# Patient Record
Sex: MALE | Race: WHITE | NOT HISPANIC OR LATINO | Employment: FULL TIME | URBAN - NONMETROPOLITAN AREA
[De-identification: names, ages, dates, MRNs, and addresses within clinical notes are randomized per-mention and may not be internally consistent; named-entity substitution may affect disease eponyms.]

---

## 2017-07-17 ENCOUNTER — HOSPITAL ENCOUNTER (OUTPATIENT)
Dept: CT IMAGING | Facility: HOSPITAL | Age: 62
Discharge: HOME | End: 2017-07-17
Admitting: NURSE PRACTITIONER

## 2017-07-17 ENCOUNTER — TRANSCRIBE ORDERS (OUTPATIENT)
Dept: ADMINISTRATIVE | Facility: HOSPITAL | Age: 62
End: 2017-07-17

## 2017-07-17 DIAGNOSIS — R10.9 ABDOMINAL PAIN, UNSPECIFIED LOCATION: ICD-10-CM

## 2017-07-17 DIAGNOSIS — R10.2 PELVIC PAIN: ICD-10-CM

## 2017-07-17 DIAGNOSIS — R63.4 WEIGHT LOSS: ICD-10-CM

## 2017-07-17 DIAGNOSIS — R11.0 NAUSEA: ICD-10-CM

## 2017-07-17 DIAGNOSIS — R10.9 ABDOMINAL PAIN, UNSPECIFIED LOCATION: Primary | ICD-10-CM

## 2017-07-17 LAB — CREAT BLDA-MCNC: 0.8 MG/DL (ref 0.6–1.3)

## 2017-07-17 PROCEDURE — 74177 CT ABD & PELVIS W/CONTRAST: CPT

## 2017-07-17 PROCEDURE — 82565 ASSAY OF CREATININE: CPT

## 2017-07-17 PROCEDURE — 0 IOPAMIDOL 61 % SOLUTION: Performed by: NURSE PRACTITIONER

## 2017-07-17 PROCEDURE — 74177 CT ABD & PELVIS W/CONTRAST: CPT | Performed by: RADIOLOGY

## 2017-07-17 RX ADMIN — IOPAMIDOL 100 ML: 612 INJECTION, SOLUTION INTRAVENOUS at 12:53

## 2018-05-25 ENCOUNTER — TRANSCRIBE ORDERS (OUTPATIENT)
Dept: ADMINISTRATIVE | Facility: HOSPITAL | Age: 63
End: 2018-05-25

## 2018-05-25 DIAGNOSIS — I10 HTN, GOAL BELOW 130/80: Primary | ICD-10-CM

## 2018-05-31 ENCOUNTER — HOSPITAL ENCOUNTER (OUTPATIENT)
Dept: ULTRASOUND IMAGING | Facility: HOSPITAL | Age: 63
Discharge: HOME | End: 2018-05-31
Admitting: NURSE PRACTITIONER

## 2018-05-31 DIAGNOSIS — I10 HTN, GOAL BELOW 130/80: ICD-10-CM

## 2018-05-31 PROCEDURE — 76775 US EXAM ABDO BACK WALL LIM: CPT | Performed by: RADIOLOGY

## 2018-05-31 PROCEDURE — 76775 US EXAM ABDO BACK WALL LIM: CPT

## 2019-06-03 ENCOUNTER — TRANSCRIBE ORDERS (OUTPATIENT)
Dept: ADMINISTRATIVE | Facility: HOSPITAL | Age: 64
End: 2019-06-03

## 2019-06-03 DIAGNOSIS — R63.4 ABNORMAL WEIGHT LOSS: ICD-10-CM

## 2019-06-03 DIAGNOSIS — K70.30 CIRRHOSIS, LAENNEC'S (HCC): Primary | ICD-10-CM

## 2019-06-13 ENCOUNTER — OFFICE VISIT (OUTPATIENT)
Dept: UROLOGY | Facility: CLINIC | Age: 64
End: 2019-06-13

## 2019-06-13 VITALS — BODY MASS INDEX: 24.38 KG/M2 | HEIGHT: 72 IN | WEIGHT: 180 LBS

## 2019-06-13 DIAGNOSIS — R35.0 FREQUENCY OF MICTURITION: Primary | ICD-10-CM

## 2019-06-13 DIAGNOSIS — R35.1 BENIGN PROSTATIC HYPERPLASIA WITH NOCTURIA: ICD-10-CM

## 2019-06-13 DIAGNOSIS — N40.1 BENIGN PROSTATIC HYPERPLASIA WITH NOCTURIA: ICD-10-CM

## 2019-06-13 DIAGNOSIS — R33.9 INCOMPLETE BLADDER EMPTYING: ICD-10-CM

## 2019-06-13 LAB
BILIRUB BLD-MCNC: NEGATIVE MG/DL
CLARITY, POC: CLEAR
COLOR UR: YELLOW
GLUCOSE UR STRIP-MCNC: NEGATIVE MG/DL
KETONES UR QL: NEGATIVE
LEUKOCYTE EST, POC: NEGATIVE
NITRITE UR-MCNC: NEGATIVE MG/ML
PH UR: 5 [PH] (ref 5–8)
PROT UR STRIP-MCNC: NEGATIVE MG/DL
RBC # UR STRIP: NEGATIVE /UL
SP GR UR: 1.03 (ref 1–1.03)
UROBILINOGEN UR QL: NORMAL

## 2019-06-13 PROCEDURE — 81003 URINALYSIS AUTO W/O SCOPE: CPT | Performed by: UROLOGY

## 2019-06-13 PROCEDURE — 99203 OFFICE O/P NEW LOW 30 MIN: CPT | Performed by: UROLOGY

## 2019-06-13 PROCEDURE — 51798 US URINE CAPACITY MEASURE: CPT | Performed by: UROLOGY

## 2019-06-13 RX ORDER — DUTASTERIDE AND TAMSULOSIN HYDROCHLORIDE CAPSULES .5; .4 MG/1; MG/1
1 CAPSULE ORAL NIGHTLY
COMMUNITY
Start: 2019-05-20 | End: 2022-04-14

## 2019-06-13 RX ORDER — LEVOTHYROXINE SODIUM 0.12 MG/1
125 TABLET ORAL DAILY
COMMUNITY
Start: 2019-05-20

## 2019-06-13 RX ORDER — CHOLECALCIFEROL (VITAMIN D3) 1250 MCG
1 CAPSULE ORAL
COMMUNITY
Start: 2019-02-26

## 2019-06-13 RX ORDER — AMLODIPINE BESYLATE AND BENAZEPRIL HYDROCHLORIDE 5; 20 MG/1; MG/1
1 CAPSULE ORAL NIGHTLY
COMMUNITY
Start: 2019-02-26

## 2019-06-13 RX ORDER — TAMSULOSIN HYDROCHLORIDE 0.4 MG/1
1 CAPSULE ORAL DAILY
COMMUNITY
Start: 2014-11-10 | End: 2022-04-14

## 2019-06-13 RX ORDER — LANSOPRAZOLE 30 MG/1
30 CAPSULE, DELAYED RELEASE ORAL DAILY
COMMUNITY
Start: 2014-11-10 | End: 2022-12-01 | Stop reason: ALTCHOICE

## 2019-06-13 RX ORDER — NADOLOL 20 MG/1
TABLET ORAL
Status: ON HOLD | COMMUNITY
Start: 2014-11-10 | End: 2019-07-24

## 2019-06-13 NOTE — PROGRESS NOTES
"Chief Complaint:          Chief Complaint   Patient presents with   • BPH w/ Urinary frequency       HPI:   64 y.o. male referred with significant BPH voiding symptomatology much worse over the last 1-1/2 years.  He is been on alpha blockade unsuccessfully and gets up every 30 minutes at night.  He is been on a 5 alpha reductase inhibitor for 2 weeks unsuccessful he gets a PSA every year that is \"good\".  He has had stones and stents by Dr. Lay.  He has no medical problems.  He was previously coal , he has   good erections with poor ejaculations.  His urine is negative with a negligible postvoid residual.  His exam shows a significant downward deflection of his penis from a chordee otherwise a normal examination.  I suspect were dealing with an unusual prostate anatomic variation with a median bar and I think he needs cystoscopy as he is a failure of both alpha blockade and 5 alpha reductase therapy      Past Medical History:        Past Medical History:   Diagnosis Date   • GERD (gastroesophageal reflux disease)    • Hiatal hernia    • Liver disease    • Thyroid disease          Current Meds:     Current Outpatient Medications   Medication Sig Dispense Refill   • amLODIPine-benazepril (LOTREL) 5-20 MG per capsule Take 1 capsule by mouth.     • Cholecalciferol (VITAMIN D3) 68680 units capsule Take 1 capsule by mouth.     • dutasteride-tamsulosin (CLARK) 0.5-0.4 MG capsule capsule Take 1 capsule by mouth.     • lansoprazole (PREVACID) 30 MG capsule Take 1 capsule by mouth.     • levothyroxine (SYNTHROID) 125 MCG tablet Take 1 tablet by mouth.     • nadolol (CORGARD) 20 MG tablet TAKE 1 TABLET BY MOUTH EVERY DAY     • tamsulosin (FLOMAX) 0.4 MG capsule 24 hr capsule Take  by mouth Daily.       No current facility-administered medications for this visit.         Allergies:      No Known Allergies     Past Surgical History:     Past Surgical History:   Procedure Laterality Date   • COLONOSCOPY     • ENDOSCOPY   "   • KIDNEY STONE SURGERY     • SEPTOPLASTY           Social History:     Social History     Socioeconomic History   • Marital status:      Spouse name: Not on file   • Number of children: Not on file   • Years of education: Not on file   • Highest education level: Not on file   Tobacco Use   • Smoking status: Former Smoker     Packs/day: 0.50     Years: 3.00     Pack years: 1.50     Types: Cigarettes     Last attempt to quit: 1979     Years since quittin.0   • Smokeless tobacco: Never Used   Substance and Sexual Activity   • Alcohol use: Yes     Alcohol/week: 0.6 oz     Types: 1 Cans of beer per week     Frequency: Never     Comment: 1/2 beer every now and then    • Drug use: No   • Sexual activity: Not Currently     Partners: Female       Family History:     Family History   Problem Relation Age of Onset   • Colon cancer Father    • Throat cancer Mother        Review of Systems:     Review of Systems   Constitutional: Negative.    HENT: Negative.    Eyes: Negative.    Respiratory: Negative.    Cardiovascular: Negative.    Gastrointestinal: Negative.    Endocrine: Negative.    Genitourinary: Positive for frequency and urgency.   Musculoskeletal: Negative.    Allergic/Immunologic: Negative.    Neurological: Negative.    Hematological: Negative.    Psychiatric/Behavioral: Negative.        Physical Exam:     Physical Exam   Constitutional: He is oriented to person, place, and time. He appears well-developed and well-nourished.   HENT:   Head: Normocephalic and atraumatic.   Eyes: Conjunctivae and EOM are normal. Pupils are equal, round, and reactive to light.   Neck: Normal range of motion.   Cardiovascular: Normal rate, regular rhythm, normal heart sounds and intact distal pulses.   Pulmonary/Chest: Effort normal and breath sounds normal.   Abdominal: Soft. Bowel sounds are normal.   Genitourinary:   Genitourinary Comments: Soft nontender abdomen with no organomegaly, rigidity, or tenderness.  He has  normal external genitalia and uncircumcised phallus with a freely movable foreskin bilaterally descended testes without masses there is no inguinal hernias adenopathy or abnormalities he had good rectal tone and a large smooth firm prostate.  There is no nodularity or any suspicious rectal abnormalities.  His penis shows a significant chordee with a downward deflection of his erection         Musculoskeletal: Normal range of motion.   Neurological: He is alert and oriented to person, place, and time. He has normal reflexes.   Skin: Skin is warm and dry.   Psychiatric: He has a normal mood and affect. His behavior is normal. Judgment and thought content normal.   Nursing note and vitals reviewed.      I have reviewed the following portions of the patient's history: allergies, current medications, past family history, past medical history, past social history, past surgical history, problem list and ROS and confirm it's accurate.      Procedure:       Assessment/Plan:   BPH: Discussed the pathophysiology of BPH and obstruction.  We discussed the static and dynamic effect effects of BPH as well as using 5 alpha reductase inhibitors versus alpha blockade.  We discussed the indications for transurethral surgery as well.  And/ or other therapeutic options available including all of the newer techniques.  He is on both Flomax and Proscar and not getting a great results probably related to the fact that he has a medium bar I am going to set him up for cystoscopy and evaluate him in this regard.      .      Patient's Body mass index is 24.41 kg/m². BMI is within normal parameters. No follow-up required..              This document has been electronically signed by GEOVANNY COYNE MD June 13, 2019 9:40 AM

## 2019-06-14 PROBLEM — N40.1 BENIGN PROSTATIC HYPERPLASIA WITH NOCTURIA: Status: ACTIVE | Noted: 2019-06-14

## 2019-06-14 PROBLEM — R35.1 BENIGN PROSTATIC HYPERPLASIA WITH NOCTURIA: Status: ACTIVE | Noted: 2019-06-14

## 2019-06-17 ENCOUNTER — HOSPITAL ENCOUNTER (OUTPATIENT)
Dept: CT IMAGING | Facility: HOSPITAL | Age: 64
Discharge: HOME | End: 2019-06-17
Admitting: INTERNAL MEDICINE

## 2019-06-17 DIAGNOSIS — K70.30 CIRRHOSIS, LAENNEC'S (HCC): ICD-10-CM

## 2019-06-17 DIAGNOSIS — R63.4 ABNORMAL WEIGHT LOSS: ICD-10-CM

## 2019-06-17 LAB — CREAT BLDA-MCNC: 0.7 MG/DL (ref 0.6–1.3)

## 2019-06-17 PROCEDURE — 74170 CT ABD WO CNTRST FLWD CNTRST: CPT | Performed by: RADIOLOGY

## 2019-06-17 PROCEDURE — 74170 CT ABD WO CNTRST FLWD CNTRST: CPT

## 2019-06-17 PROCEDURE — 82565 ASSAY OF CREATININE: CPT

## 2019-06-17 PROCEDURE — 0 IOVERSOL 68 % SOLUTION: Performed by: INTERNAL MEDICINE

## 2019-06-17 RX ADMIN — IOVERSOL 90 ML: 678 INJECTION INTRA-ARTERIAL; INTRAVENOUS at 08:51

## 2019-06-24 ENCOUNTER — PROCEDURE VISIT (OUTPATIENT)
Dept: UROLOGY | Facility: CLINIC | Age: 64
End: 2019-06-24

## 2019-06-24 VITALS — WEIGHT: 181 LBS | BODY MASS INDEX: 24.52 KG/M2 | HEIGHT: 72 IN

## 2019-06-24 DIAGNOSIS — R35.1 BENIGN PROSTATIC HYPERPLASIA WITH NOCTURIA: ICD-10-CM

## 2019-06-24 DIAGNOSIS — R33.8 URINARY RETENTION DUE TO BENIGN PROSTATIC HYPERPLASIA: ICD-10-CM

## 2019-06-24 DIAGNOSIS — N40.1 BENIGN PROSTATIC HYPERPLASIA WITH NOCTURIA: ICD-10-CM

## 2019-06-24 DIAGNOSIS — Z48.816 AFTERCARE FOLLOWING SURGERY OF THE GENITOURINARY SYSTEM: Primary | ICD-10-CM

## 2019-06-24 DIAGNOSIS — N40.1 URINARY RETENTION DUE TO BENIGN PROSTATIC HYPERPLASIA: ICD-10-CM

## 2019-06-24 PROCEDURE — 52000 CYSTOURETHROSCOPY: CPT | Performed by: UROLOGY

## 2019-06-24 PROCEDURE — 96372 THER/PROPH/DIAG INJ SC/IM: CPT | Performed by: UROLOGY

## 2019-06-24 RX ORDER — GENTAMICIN SULFATE 40 MG/ML
80 INJECTION, SOLUTION INTRAMUSCULAR; INTRAVENOUS ONCE
Status: COMPLETED | OUTPATIENT
Start: 2019-06-24 | End: 2019-06-24

## 2019-06-24 RX ORDER — GENTAMICIN SULFATE 80 MG/100ML
80 INJECTION, SOLUTION INTRAVENOUS ONCE
Status: CANCELLED | OUTPATIENT
Start: 2019-07-24 | End: 2019-06-24

## 2019-06-24 RX ADMIN — GENTAMICIN SULFATE 80 MG: 40 INJECTION, SOLUTION INTRAMUSCULAR; INTRAVENOUS at 13:12

## 2019-06-24 NOTE — PROGRESS NOTES
"Chief Complaint:          Chief Complaint   Patient presents with   • Benign Prostatic Hypertrophy     cystoscopy       HPI:   64 y.o. male referred with significant BPH voiding symptomatology much worse over the last 1-1/2 years.  He is been on alpha blockade unsuccessfully and gets up every 30 minutes at night.  He is been on a 5 alpha reductase inhibitor for 2 weeks unsuccessful he gets a PSA every year that is \"good\".  He has had stones and stents by Dr. Velasquez.  He has no medical problems.  He was previously coal , he has   good erections with poor ejaculations.  His urine is negative with a negligible postvoid residual.  His exam shows a significant downward deflection of his penis from a chordee otherwise a normal examination.  I suspect were dealing with an unusual prostate anatomic variation with a median bar and I think he needs cystoscopy as he is a failure of both alpha blockade and 5 alpha reductase therapy  He returns today for cystoscopy.  His catheter was removed.  He has significant obstruction with a medium bar as I expected.  I meant to recommend a TURP I discussed this with him at length.  He is failed alpha blockade this is his only real option to be voiding normally without a catheter      Past Medical History:        Past Medical History:   Diagnosis Date   • GERD (gastroesophageal reflux disease)    • Hiatal hernia    • Liver disease    • Thyroid disease          Current Meds:     Current Outpatient Medications   Medication Sig Dispense Refill   • amLODIPine-benazepril (LOTREL) 5-20 MG per capsule Take 1 capsule by mouth.     • Cholecalciferol (VITAMIN D3) 83920 units capsule Take 1 capsule by mouth.     • dutasteride-tamsulosin (CLARK) 0.5-0.4 MG capsule capsule Take 1 capsule by mouth.     • lansoprazole (PREVACID) 30 MG capsule Take 1 capsule by mouth.     • levothyroxine (SYNTHROID) 125 MCG tablet Take 1 tablet by mouth.     • nadolol (CORGARD) 20 MG tablet TAKE 1 TABLET BY MOUTH " EVERY DAY     • tamsulosin (FLOMAX) 0.4 MG capsule 24 hr capsule Take  by mouth Daily.       No current facility-administered medications for this visit.         Allergies:      No Known Allergies     Past Surgical History:     Past Surgical History:   Procedure Laterality Date   • COLONOSCOPY     • ENDOSCOPY     • KIDNEY STONE SURGERY     • SEPTOPLASTY           Social History:     Social History     Socioeconomic History   • Marital status:      Spouse name: Not on file   • Number of children: Not on file   • Years of education: Not on file   • Highest education level: Not on file   Tobacco Use   • Smoking status: Former Smoker     Packs/day: 0.50     Years: 3.00     Pack years: 1.50     Types: Cigarettes     Last attempt to quit: 1979     Years since quittin.0   • Smokeless tobacco: Never Used   Substance and Sexual Activity   • Alcohol use: Yes     Alcohol/week: 0.6 oz     Types: 1 Cans of beer per week     Frequency: Never     Comment: 1/2 beer every now and then    • Drug use: No   • Sexual activity: Not Currently     Partners: Female       Family History:     Family History   Problem Relation Age of Onset   • Colon cancer Father    • Throat cancer Mother        Review of Systems:     Review of Systems   Constitutional: Negative.    HENT: Negative.    Eyes: Negative.    Respiratory: Negative.    Cardiovascular: Negative.    Gastrointestinal: Negative.    Endocrine: Negative.    Genitourinary: Positive for difficulty urinating, frequency and urgency.   Musculoskeletal: Negative.    Allergic/Immunologic: Negative.    Neurological: Negative.    Hematological: Negative.    Psychiatric/Behavioral: Negative.        Physical Exam:     Physical Exam   Constitutional: He is oriented to person, place, and time. He appears well-developed and well-nourished.   HENT:   Head: Normocephalic and atraumatic.   Eyes: Conjunctivae and EOM are normal. Pupils are equal, round, and reactive to light.   Neck: Normal  range of motion.   Cardiovascular: Normal rate, regular rhythm, normal heart sounds and intact distal pulses.   Pulmonary/Chest: Effort normal and breath sounds normal.   Abdominal: Soft. Bowel sounds are normal.   Musculoskeletal: Normal range of motion.   Neurological: He is alert and oriented to person, place, and time. He has normal reflexes.   Skin: Skin is warm and dry.   Psychiatric: He has a normal mood and affect. His behavior is normal. Judgment and thought content normal.   Nursing note and vitals reviewed.      I have reviewed the following portions of the patient's history: allergies, current medications, past family history, past medical history, past social history, past surgical history, problem list and ROS and confirm it's accurate.      Procedure:   Cystoscopy:  Patient presents today for cystourethroscopy.  I went ahead and obtained an informed consent including the risk of anesthesia, bleeding, infection, etc.  After prep and drape in a sterile fashion in the low dorsal lithotomy position the urethra was gently anesthetized with 10 cc of 2% viscous Xylocaine jelly.  After an appropriate period of topical anesthesia I used the Olympus digital 14 [place] flexible cystoscope to examine the anterior urethra which was completely normal, the ureteral orifices were visualized and normal in position and configuration there were no stones, tumors or foreign bodies.  There was significant obstruction from a medium bar.  the patient was given 80 mg of gentamicin in an intramuscular fashion  as prophylaxis for the cystoscopy and released from the clinic.    Assessment/Plan:   Urinary retention secondary to BPH-he is failed maximal medical therapy and I am recommending a TURP with the attendant risks and benefits of incontinence, inability to urinate etc. I re-encouraged the Laurent catheter until the time of surgical intervention            Patient's Body mass index is 24.55 kg/m². BMI is within normal  parameters. No follow-up required..              This document has been electronically signed by GEOVANNY COYNE MD June 24, 2019 12:52 PM

## 2019-06-26 PROBLEM — N40.1 URINARY RETENTION DUE TO BENIGN PROSTATIC HYPERPLASIA: Status: ACTIVE | Noted: 2019-06-26

## 2019-06-26 PROBLEM — R33.8 URINARY RETENTION DUE TO BENIGN PROSTATIC HYPERPLASIA: Status: ACTIVE | Noted: 2019-06-26

## 2019-07-23 ENCOUNTER — APPOINTMENT (OUTPATIENT)
Dept: PREADMISSION TESTING | Facility: HOSPITAL | Age: 64
End: 2019-07-23

## 2019-07-23 LAB
ANION GAP SERPL CALCULATED.3IONS-SCNC: 12.4 MMOL/L (ref 5–15)
BUN BLD-MCNC: 17 MG/DL (ref 8–23)
BUN/CREAT SERPL: 20 (ref 7–25)
CALCIUM SPEC-SCNC: 9.4 MG/DL (ref 8.6–10.5)
CHLORIDE SERPL-SCNC: 103 MMOL/L (ref 98–107)
CO2 SERPL-SCNC: 24.6 MMOL/L (ref 22–29)
CREAT BLD-MCNC: 0.85 MG/DL (ref 0.76–1.27)
DEPRECATED RDW RBC AUTO: 45 FL (ref 37–54)
ERYTHROCYTE [DISTWIDTH] IN BLOOD BY AUTOMATED COUNT: 13.8 % (ref 12.3–15.4)
GFR SERPL CREATININE-BSD FRML MDRD: 91 ML/MIN/1.73
GLUCOSE BLD-MCNC: 127 MG/DL (ref 65–99)
HCT VFR BLD AUTO: 38 % (ref 37.5–51)
HGB BLD-MCNC: 12.8 G/DL (ref 13–17.7)
MCH RBC QN AUTO: 30.6 PG (ref 26.6–33)
MCHC RBC AUTO-ENTMCNC: 33.7 G/DL (ref 31.5–35.7)
MCV RBC AUTO: 90.9 FL (ref 79–97)
PLATELET # BLD AUTO: 128 10*3/MM3 (ref 140–450)
PMV BLD AUTO: 11.3 FL (ref 6–12)
POTASSIUM BLD-SCNC: 3.8 MMOL/L (ref 3.5–5.2)
RBC # BLD AUTO: 4.18 10*6/MM3 (ref 4.14–5.8)
SODIUM BLD-SCNC: 140 MMOL/L (ref 136–145)
WBC NRBC COR # BLD: 4.94 10*3/MM3 (ref 3.4–10.8)

## 2019-07-23 PROCEDURE — 93005 ELECTROCARDIOGRAM TRACING: CPT

## 2019-07-23 PROCEDURE — 80048 BASIC METABOLIC PNL TOTAL CA: CPT | Performed by: UROLOGY

## 2019-07-23 PROCEDURE — 93010 ELECTROCARDIOGRAM REPORT: CPT | Performed by: INTERNAL MEDICINE

## 2019-07-23 PROCEDURE — 85027 COMPLETE CBC AUTOMATED: CPT | Performed by: UROLOGY

## 2019-07-23 PROCEDURE — 36415 COLL VENOUS BLD VENIPUNCTURE: CPT

## 2019-07-23 NOTE — DISCHARGE INSTRUCTIONS
TAKE the following medications the morning of surgery:  All heart or blood pressure medications    Please discontinue all blood thinners and anticoagulants (except aspirin) prior to surgery as per your surgeon and cardiologist instructions.  Aspirin may be continued up to the day prior to surgery.    HOLD all diabetic medications the morning of surgery as order by physician.    Arrival time for surgery on 7/24/2019 will be called to you by Dr. Campbell's office.    General Instructions:  • Do NOT eat or drink after midnight 7/23/2019 which includes water, mints, or gum.  • You may brush your teeth. Dental appliances that are removable must be taken out day of surgery.  • Do NOT smoke, chew tobacco, or drink alcohol within 24 hours prior to surgery.  • Bring medications in original bottles, any inhalers and if applicable your C-PAP/BI-PAP machine  • Bring any papers given to you in the doctor’s office  • Wear clean, comfortable clothes and socks  • Do NOT wear contact lenses or make-up or dark nail polish.  Bring a case for your glasses if applicable.  • Bring crutches or walker if applicable  • Leave all other valuables and jewelry at home  • If you were given a blood bank armband, continue to wear it until discharged.    Preventing a Surgical Site Infection:  • Shower the night before surgery (unless instructed otherwise) using a fresh bar of anti-bacterial soap (such as Dial) and clean washcloth.  Dry with a clean towel and dress in clean clothing.  • For 2 to 3 days before surgery, avoid shaving with a razor near where you will have surgery because the razor can irritate skin and make it easier to develop an infection.  Ask your surgeon if you will be receiving antibiotics prior to surgery.  • Make sure you, your family, and all healthcare providers clean their hands with soap and water or an alcohol-based hand  before caring for you or your wound.  • If at all possible, quit smoking as many days before  surgery as you can.    Day of Surgery:  Upon arrival, a pre-op nurse and anesthesiologist will review your health history, obtain vital signs, and answer questions you may have.  The only belongings needed at this time will be your home medications and if applicable you C-PAP/BI-PAP machine.  If you are staying overnight, your family can leave the rest of your belongings in the car and bring them to your room later.  A pre-op nurse will start an IV and you may receive medication in preparation for surgery.  Due to patient privacy and limited space, only one member of your family will be able to accompany you in the pre-op area.  While you are in surgery your family should notify the waiting room  if they leave the waiting room area and provide a contact number.  Please be aware that surgery does come with discomfort.  We want to make every effort to control your discomfort so please discuss any uncontrolled symptoms with your nurse.  Your doctor will most likely have prescribed pain medications.  If you are going home after surgery you will receive individualized written care instructions before being discharged.  A responsible adult must drive you to and from the hospital on the day of surgery and stay with you for 24 hours.  If you are staying overnight following surgery, you will be transported to your hospital room following the recovery period.

## 2019-07-24 ENCOUNTER — APPOINTMENT (OUTPATIENT)
Dept: GENERAL RADIOLOGY | Facility: HOSPITAL | Age: 64
End: 2019-07-24

## 2019-07-24 ENCOUNTER — HOSPITAL ENCOUNTER (OUTPATIENT)
Facility: HOSPITAL | Age: 64
Discharge: HOME | End: 2019-07-25
Attending: UROLOGY | Admitting: UROLOGY

## 2019-07-24 ENCOUNTER — ANESTHESIA EVENT (OUTPATIENT)
Dept: PERIOP | Facility: HOSPITAL | Age: 64
End: 2019-07-24

## 2019-07-24 ENCOUNTER — ANESTHESIA (OUTPATIENT)
Dept: PERIOP | Facility: HOSPITAL | Age: 64
End: 2019-07-24

## 2019-07-24 DIAGNOSIS — R35.1 BENIGN PROSTATIC HYPERPLASIA WITH NOCTURIA: ICD-10-CM

## 2019-07-24 DIAGNOSIS — N40.1 BENIGN PROSTATIC HYPERPLASIA WITH NOCTURIA: ICD-10-CM

## 2019-07-24 PROBLEM — Z48.816 AFTERCARE FOLLOWING SURGERY OF THE GENITOURINARY SYSTEM: Status: ACTIVE | Noted: 2019-07-24

## 2019-07-24 PROCEDURE — 25810000003 SODIUM CHLORIDE 0.9 % WITH KCL 20 MEQ 20-0.9 MEQ/L-% SOLUTION: Performed by: UROLOGY

## 2019-07-24 PROCEDURE — 25010000002 ONDANSETRON PER 1 MG: Performed by: NURSE ANESTHETIST, CERTIFIED REGISTERED

## 2019-07-24 PROCEDURE — 52601 PROSTATECTOMY (TURP): CPT | Performed by: UROLOGY

## 2019-07-24 PROCEDURE — 25010000002 PROPOFOL 10 MG/ML EMULSION: Performed by: NURSE ANESTHETIST, CERTIFIED REGISTERED

## 2019-07-24 PROCEDURE — 25010000002 PROMETHAZINE PER 50 MG: Performed by: UROLOGY

## 2019-07-24 PROCEDURE — 25010000002 FENTANYL CITRATE (PF) 100 MCG/2ML SOLUTION: Performed by: NURSE ANESTHETIST, CERTIFIED REGISTERED

## 2019-07-24 PROCEDURE — 25010000002 GENTAMICIN PER 80 MG: Performed by: UROLOGY

## 2019-07-24 PROCEDURE — 94799 UNLISTED PULMONARY SVC/PX: CPT

## 2019-07-24 PROCEDURE — 25010000002 MIDAZOLAM PER 1 MG: Performed by: NURSE ANESTHETIST, CERTIFIED REGISTERED

## 2019-07-24 PROCEDURE — 25010000002 HYDROMORPHONE PER 4 MG: Performed by: NURSE ANESTHETIST, CERTIFIED REGISTERED

## 2019-07-24 RX ORDER — MAGNESIUM HYDROXIDE 1200 MG/15ML
LIQUID ORAL AS NEEDED
Status: DISCONTINUED | OUTPATIENT
Start: 2019-07-24 | End: 2019-07-24 | Stop reason: HOSPADM

## 2019-07-24 RX ORDER — SODIUM CHLORIDE 0.9 % (FLUSH) 0.9 %
3 SYRINGE (ML) INJECTION EVERY 12 HOURS SCHEDULED
Status: DISCONTINUED | OUTPATIENT
Start: 2019-07-24 | End: 2019-07-24 | Stop reason: HOSPADM

## 2019-07-24 RX ORDER — FINASTERIDE 5 MG/1
5 TABLET, FILM COATED ORAL NIGHTLY
Status: DISCONTINUED | OUTPATIENT
Start: 2019-07-24 | End: 2019-07-25

## 2019-07-24 RX ORDER — GENTAMICIN SULFATE 80 MG/100ML
80 INJECTION, SOLUTION INTRAVENOUS EVERY 8 HOURS
Status: DISCONTINUED | OUTPATIENT
Start: 2019-07-24 | End: 2019-07-25

## 2019-07-24 RX ORDER — ATROPA BELLADONNA AND OPIUM 16.2; 3 MG/1; MG/1
30 SUPPOSITORY RECTAL DAILY PRN
Status: DISCONTINUED | OUTPATIENT
Start: 2019-07-24 | End: 2019-07-25 | Stop reason: HOSPADM

## 2019-07-24 RX ORDER — LIDOCAINE HYDROCHLORIDE 20 MG/ML
INJECTION, SOLUTION INFILTRATION; PERINEURAL AS NEEDED
Status: DISCONTINUED | OUTPATIENT
Start: 2019-07-24 | End: 2019-07-24 | Stop reason: SURG

## 2019-07-24 RX ORDER — SODIUM CHLORIDE, SODIUM LACTATE, POTASSIUM CHLORIDE, CALCIUM CHLORIDE 600; 310; 30; 20 MG/100ML; MG/100ML; MG/100ML; MG/100ML
125 INJECTION, SOLUTION INTRAVENOUS CONTINUOUS
Status: DISCONTINUED | OUTPATIENT
Start: 2019-07-24 | End: 2019-07-25 | Stop reason: HOSPADM

## 2019-07-24 RX ORDER — NADOLOL 40 MG/1
20 TABLET ORAL
Status: CANCELLED | OUTPATIENT
Start: 2019-07-24

## 2019-07-24 RX ORDER — TAMSULOSIN HYDROCHLORIDE 0.4 MG/1
0.4 CAPSULE ORAL NIGHTLY
Status: DISCONTINUED | OUTPATIENT
Start: 2019-07-24 | End: 2019-07-25

## 2019-07-24 RX ORDER — SODIUM CHLORIDE AND POTASSIUM CHLORIDE 150; 900 MG/100ML; MG/100ML
100 INJECTION, SOLUTION INTRAVENOUS CONTINUOUS
Status: DISCONTINUED | OUTPATIENT
Start: 2019-07-24 | End: 2019-07-25 | Stop reason: HOSPADM

## 2019-07-24 RX ORDER — NALOXONE HCL 0.4 MG/ML
0.4 VIAL (ML) INJECTION
Status: DISCONTINUED | OUTPATIENT
Start: 2019-07-24 | End: 2019-07-25 | Stop reason: HOSPADM

## 2019-07-24 RX ORDER — MAGNESIUM HYDROXIDE 1200 MG/15ML
3000 LIQUID ORAL
Status: DISCONTINUED | OUTPATIENT
Start: 2019-07-24 | End: 2019-07-25

## 2019-07-24 RX ORDER — PROPOFOL 10 MG/ML
VIAL (ML) INTRAVENOUS AS NEEDED
Status: DISCONTINUED | OUTPATIENT
Start: 2019-07-24 | End: 2019-07-24 | Stop reason: SURG

## 2019-07-24 RX ORDER — SODIUM CHLORIDE 0.9 % (FLUSH) 0.9 %
3-10 SYRINGE (ML) INJECTION AS NEEDED
Status: DISCONTINUED | OUTPATIENT
Start: 2019-07-24 | End: 2019-07-24 | Stop reason: HOSPADM

## 2019-07-24 RX ORDER — LEVOTHYROXINE SODIUM 0.12 MG/1
125 TABLET ORAL DAILY
Status: CANCELLED | OUTPATIENT
Start: 2019-07-25

## 2019-07-24 RX ORDER — ONDANSETRON 2 MG/ML
4 INJECTION INTRAMUSCULAR; INTRAVENOUS AS NEEDED
Status: DISCONTINUED | OUTPATIENT
Start: 2019-07-24 | End: 2019-07-24 | Stop reason: HOSPADM

## 2019-07-24 RX ORDER — OXYCODONE AND ACETAMINOPHEN 10; 325 MG/1; MG/1
1 TABLET ORAL EVERY 4 HOURS PRN
Status: DISCONTINUED | OUTPATIENT
Start: 2019-07-24 | End: 2019-07-25 | Stop reason: HOSPADM

## 2019-07-24 RX ORDER — PROMETHAZINE HYDROCHLORIDE 25 MG/ML
12.5 INJECTION, SOLUTION INTRAMUSCULAR; INTRAVENOUS EVERY 6 HOURS PRN
Status: DISCONTINUED | OUTPATIENT
Start: 2019-07-24 | End: 2019-07-25 | Stop reason: HOSPADM

## 2019-07-24 RX ORDER — MAGNESIUM HYDROXIDE 1200 MG/15ML
LIQUID ORAL
Status: COMPLETED
Start: 2019-07-24 | End: 2019-07-24

## 2019-07-24 RX ORDER — LEVOTHYROXINE SODIUM 0.12 MG/1
125 TABLET ORAL EVERY MORNING
Status: DISCONTINUED | OUTPATIENT
Start: 2019-07-25 | End: 2019-07-25 | Stop reason: HOSPADM

## 2019-07-24 RX ORDER — MAGNESIUM HYDROXIDE 1200 MG/15ML
LIQUID ORAL
Status: DISCONTINUED
Start: 2019-07-24 | End: 2019-07-24 | Stop reason: WASHOUT

## 2019-07-24 RX ORDER — NADOLOL 40 MG/1
20 TABLET ORAL
Status: DISCONTINUED | OUTPATIENT
Start: 2019-07-24 | End: 2019-07-25 | Stop reason: HOSPADM

## 2019-07-24 RX ORDER — GENTAMICIN SULFATE 80 MG/100ML
80 INJECTION, SOLUTION INTRAVENOUS ONCE
Status: COMPLETED | OUTPATIENT
Start: 2019-07-24 | End: 2019-07-24

## 2019-07-24 RX ORDER — IPRATROPIUM BROMIDE AND ALBUTEROL SULFATE 2.5; .5 MG/3ML; MG/3ML
3 SOLUTION RESPIRATORY (INHALATION) ONCE AS NEEDED
Status: DISCONTINUED | OUTPATIENT
Start: 2019-07-24 | End: 2019-07-24 | Stop reason: HOSPADM

## 2019-07-24 RX ORDER — ONDANSETRON 2 MG/ML
INJECTION INTRAMUSCULAR; INTRAVENOUS AS NEEDED
Status: DISCONTINUED | OUTPATIENT
Start: 2019-07-24 | End: 2019-07-24 | Stop reason: SURG

## 2019-07-24 RX ORDER — NADOLOL 20 MG/1
20 TABLET ORAL NIGHTLY
COMMUNITY

## 2019-07-24 RX ORDER — PANTOPRAZOLE SODIUM 40 MG/1
40 TABLET, DELAYED RELEASE ORAL EVERY MORNING
Status: CANCELLED | OUTPATIENT
Start: 2019-07-25

## 2019-07-24 RX ORDER — FENTANYL CITRATE 50 UG/ML
INJECTION, SOLUTION INTRAMUSCULAR; INTRAVENOUS AS NEEDED
Status: DISCONTINUED | OUTPATIENT
Start: 2019-07-24 | End: 2019-07-24 | Stop reason: SURG

## 2019-07-24 RX ORDER — FAMOTIDINE 10 MG/ML
INJECTION, SOLUTION INTRAVENOUS AS NEEDED
Status: DISCONTINUED | OUTPATIENT
Start: 2019-07-24 | End: 2019-07-24 | Stop reason: SURG

## 2019-07-24 RX ORDER — PANTOPRAZOLE SODIUM 40 MG/1
40 TABLET, DELAYED RELEASE ORAL EVERY MORNING
Status: DISCONTINUED | OUTPATIENT
Start: 2019-07-25 | End: 2019-07-25 | Stop reason: HOSPADM

## 2019-07-24 RX ORDER — TAMSULOSIN HYDROCHLORIDE 0.4 MG/1
0.4 CAPSULE ORAL DAILY
Status: CANCELLED | OUTPATIENT
Start: 2019-07-25

## 2019-07-24 RX ORDER — MEPERIDINE HYDROCHLORIDE 25 MG/ML
12.5 INJECTION INTRAMUSCULAR; INTRAVENOUS; SUBCUTANEOUS
Status: DISCONTINUED | OUTPATIENT
Start: 2019-07-24 | End: 2019-07-24 | Stop reason: HOSPADM

## 2019-07-24 RX ORDER — GLYCOPYRROLATE 0.2 MG/ML
INJECTION INTRAMUSCULAR; INTRAVENOUS AS NEEDED
Status: DISCONTINUED | OUTPATIENT
Start: 2019-07-24 | End: 2019-07-24 | Stop reason: SURG

## 2019-07-24 RX ORDER — FENTANYL CITRATE 50 UG/ML
50 INJECTION, SOLUTION INTRAMUSCULAR; INTRAVENOUS
Status: DISCONTINUED | OUTPATIENT
Start: 2019-07-24 | End: 2019-07-24 | Stop reason: HOSPADM

## 2019-07-24 RX ORDER — ATROPA BELLADONNA AND OPIUM 16.2; 6 MG/1; MG/1
SUPPOSITORY RECTAL AS NEEDED
Status: DISCONTINUED | OUTPATIENT
Start: 2019-07-24 | End: 2019-07-24 | Stop reason: HOSPADM

## 2019-07-24 RX ORDER — MIDAZOLAM HYDROCHLORIDE 1 MG/ML
INJECTION INTRAMUSCULAR; INTRAVENOUS AS NEEDED
Status: DISCONTINUED | OUTPATIENT
Start: 2019-07-24 | End: 2019-07-24 | Stop reason: SURG

## 2019-07-24 RX ORDER — HYDROMORPHONE HCL 110MG/55ML
PATIENT CONTROLLED ANALGESIA SYRINGE INTRAVENOUS AS NEEDED
Status: DISCONTINUED | OUTPATIENT
Start: 2019-07-24 | End: 2019-07-24 | Stop reason: SURG

## 2019-07-24 RX ORDER — OXYCODONE HYDROCHLORIDE AND ACETAMINOPHEN 5; 325 MG/1; MG/1
1 TABLET ORAL ONCE AS NEEDED
Status: DISCONTINUED | OUTPATIENT
Start: 2019-07-24 | End: 2019-07-24 | Stop reason: HOSPADM

## 2019-07-24 RX ADMIN — POTASSIUM CHLORIDE AND SODIUM CHLORIDE 100 ML/HR: 900; 150 INJECTION, SOLUTION INTRAVENOUS at 14:28

## 2019-07-24 RX ADMIN — EPHEDRINE SULFATE 10 MG: 50 INJECTION INTRAMUSCULAR; INTRAVENOUS; SUBCUTANEOUS at 10:07

## 2019-07-24 RX ADMIN — FENTANYL CITRATE 50 MCG: 50 INJECTION INTRAMUSCULAR; INTRAVENOUS at 09:01

## 2019-07-24 RX ADMIN — FENTANYL CITRATE 50 MCG: 50 INJECTION INTRAMUSCULAR; INTRAVENOUS at 09:09

## 2019-07-24 RX ADMIN — FENTANYL CITRATE 50 MCG: 50 INJECTION INTRAMUSCULAR; INTRAVENOUS at 08:50

## 2019-07-24 RX ADMIN — SODIUM CHLORIDE 3000 ML: 900 IRRIGANT IRRIGATION at 13:37

## 2019-07-24 RX ADMIN — SODIUM CHLORIDE 3000 ML: 900 IRRIGANT IRRIGATION at 18:40

## 2019-07-24 RX ADMIN — LIDOCAINE HYDROCHLORIDE 60 MG: 20 INJECTION, SOLUTION INFILTRATION; PERINEURAL at 08:50

## 2019-07-24 RX ADMIN — SODIUM CHLORIDE 3000 ML: 900 IRRIGANT IRRIGATION at 19:03

## 2019-07-24 RX ADMIN — SODIUM CHLORIDE, POTASSIUM CHLORIDE, SODIUM LACTATE AND CALCIUM CHLORIDE 125 ML/HR: 600; 310; 30; 20 INJECTION, SOLUTION INTRAVENOUS at 08:25

## 2019-07-24 RX ADMIN — MIDAZOLAM HYDROCHLORIDE 2 MG: 1 INJECTION, SOLUTION INTRAMUSCULAR; INTRAVENOUS at 08:47

## 2019-07-24 RX ADMIN — SODIUM CHLORIDE 3000 ML: 900 IRRIGANT IRRIGATION at 15:31

## 2019-07-24 RX ADMIN — ONDANSETRON 4 MG: 2 INJECTION, SOLUTION INTRAMUSCULAR; INTRAVENOUS at 10:18

## 2019-07-24 RX ADMIN — PROMETHAZINE HYDROCHLORIDE 12.5 MG: 25 INJECTION INTRAMUSCULAR; INTRAVENOUS at 14:02

## 2019-07-24 RX ADMIN — SODIUM CHLORIDE 3000 ML: 900 IRRIGANT IRRIGATION at 19:02

## 2019-07-24 RX ADMIN — SODIUM CHLORIDE 1000 ML: 900 IRRIGANT IRRIGATION at 14:59

## 2019-07-24 RX ADMIN — SODIUM CHLORIDE 3000 ML: 900 IRRIGANT IRRIGATION at 17:47

## 2019-07-24 RX ADMIN — PROPOFOL 150 MG: 10 INJECTION, EMULSION INTRAVENOUS at 08:50

## 2019-07-24 RX ADMIN — EPHEDRINE SULFATE 10 MG: 50 INJECTION INTRAMUSCULAR; INTRAVENOUS; SUBCUTANEOUS at 08:55

## 2019-07-24 RX ADMIN — SODIUM CHLORIDE, POTASSIUM CHLORIDE, SODIUM LACTATE AND CALCIUM CHLORIDE: 600; 310; 30; 20 INJECTION, SOLUTION INTRAVENOUS at 10:06

## 2019-07-24 RX ADMIN — SODIUM CHLORIDE 3000 ML: 900 IRRIGANT IRRIGATION at 13:53

## 2019-07-24 RX ADMIN — SODIUM CHLORIDE 3000 ML: 900 IRRIGANT IRRIGATION at 14:28

## 2019-07-24 RX ADMIN — ONDANSETRON 4 MG: 2 INJECTION, SOLUTION INTRAMUSCULAR; INTRAVENOUS at 11:12

## 2019-07-24 RX ADMIN — ATROPA BELLADONNA AND OPIUM 30 MG: 16.2; 3 SUPPOSITORY RECTAL at 18:46

## 2019-07-24 RX ADMIN — TAMSULOSIN HYDROCHLORIDE 0.4 MG: 0.4 CAPSULE ORAL at 20:54

## 2019-07-24 RX ADMIN — SODIUM CHLORIDE 3000 ML: 900 IRRIGANT IRRIGATION at 20:30

## 2019-07-24 RX ADMIN — GENTAMICIN SULFATE 80 MG: 80 INJECTION, SOLUTION INTRAVENOUS at 08:47

## 2019-07-24 RX ADMIN — FENTANYL CITRATE 50 MCG: 50 INJECTION INTRAMUSCULAR; INTRAVENOUS at 09:20

## 2019-07-24 RX ADMIN — SODIUM CHLORIDE 3000 ML: 900 IRRIGANT IRRIGATION at 16:25

## 2019-07-24 RX ADMIN — SODIUM CHLORIDE 3000 ML: 900 IRRIGANT IRRIGATION at 17:18

## 2019-07-24 RX ADMIN — GLYCOPYRROLATE 0.2 MG: 0.2 INJECTION, SOLUTION INTRAMUSCULAR; INTRAVENOUS at 09:01

## 2019-07-24 RX ADMIN — FAMOTIDINE 20 MG: 10 INJECTION, SOLUTION INTRAVENOUS at 08:47

## 2019-07-24 RX ADMIN — SODIUM CHLORIDE 3000 ML: 900 IRRIGANT IRRIGATION at 16:35

## 2019-07-24 RX ADMIN — GENTAMICIN SULFATE 80 MG: 80 INJECTION, SOLUTION INTRAVENOUS at 17:17

## 2019-07-24 RX ADMIN — SODIUM CHLORIDE 3000 ML: 900 IRRIGANT IRRIGATION at 23:31

## 2019-07-24 RX ADMIN — SODIUM CHLORIDE 3000 ML: 900 IRRIGANT IRRIGATION at 15:00

## 2019-07-24 RX ADMIN — EPHEDRINE SULFATE 10 MG: 50 INJECTION INTRAMUSCULAR; INTRAVENOUS; SUBCUTANEOUS at 08:58

## 2019-07-24 RX ADMIN — HYDROMORPHONE HYDROCHLORIDE 0.5 MG: 2 INJECTION, SOLUTION INTRAMUSCULAR; INTRAVENOUS; SUBCUTANEOUS at 10:27

## 2019-07-24 RX ADMIN — HYDROMORPHONE HYDROCHLORIDE 0.5 MG: 2 INJECTION, SOLUTION INTRAMUSCULAR; INTRAVENOUS; SUBCUTANEOUS at 09:34

## 2019-07-24 RX ADMIN — SODIUM CHLORIDE 3000 ML: 900 IRRIGANT IRRIGATION at 20:31

## 2019-07-24 RX ADMIN — FINASTERIDE 5 MG: 5 TABLET, FILM COATED ORAL at 20:54

## 2019-07-24 RX ADMIN — SODIUM CHLORIDE 3000 ML: 900 IRRIGANT IRRIGATION at 23:30

## 2019-07-24 NOTE — ANESTHESIA POSTPROCEDURE EVALUATION
Patient: Jose Maier    Procedure Summary     Date:  07/24/19 Room / Location:   [facility] /  [facility]    Anesthesia Start:  0847 Anesthesia Stop:  1030    Procedure:  Cystoscopy and transurethral resection of the prostate, removal of bladder stone (N/A ) Diagnosis:       Benign prostatic hyperplasia with nocturia      (Benign prostatic hyperplasia with nocturia [N40.1, R35.1])    Surgeon:  Deuce Campbell MD Provider:  Prasad Moya MD    Anesthesia Type:  general ASA Status:  3          Anesthesia Type: general  Last vitals  BP   123/91 (07/24/19 1109)   Temp   97.5 °F (36.4 °C) (07/24/19 1109)   Pulse   68 (07/24/19 1109)   Resp   16 (07/24/19 1109)     SpO2   99 % (07/24/19 1109)     Post Anesthesia Care and Evaluation    Patient location during evaluation: PHASE II  Patient participation: complete - patient participated  Level of consciousness: awake and alert  Pain score: 0  Pain management: adequate  Airway patency: patent  Anesthetic complications: No anesthetic complications    Cardiovascular status: acceptable  Respiratory status: acceptable  Hydration status: acceptable

## 2019-07-24 NOTE — ANESTHESIA PREPROCEDURE EVALUATION
Anesthesia Evaluation                  Airway   Mallampati: II  TM distance: >3 FB  Neck ROM: full  no difficulty expected  Dental    (+) poor dentition    Pulmonary    (+) decreased breath sounds,   Cardiovascular     Rhythm: regular  Rate: normal    (+) hypertension,       Neuro/Psych  GI/Hepatic/Renal/Endo    (+)  hiatal hernia, GERD,  liver disease fatty liver disease,     Musculoskeletal     Abdominal    Substance History      OB/GYN          Other                        Anesthesia Plan    ASA 3     general     intravenous induction   Anesthetic plan, all risks, benefits, and alternatives have been provided, discussed and informed consent has been obtained with: patient.  Use of blood products discussed with patient .   Plan discussed with CRNA.

## 2019-07-25 VITALS
OXYGEN SATURATION: 97 % | SYSTOLIC BLOOD PRESSURE: 122 MMHG | TEMPERATURE: 98.5 F | BODY MASS INDEX: 23.74 KG/M2 | RESPIRATION RATE: 18 BRPM | DIASTOLIC BLOOD PRESSURE: 77 MMHG | WEIGHT: 175.3 LBS | HEART RATE: 55 BPM | HEIGHT: 72 IN

## 2019-07-25 LAB
LAB AP CASE REPORT: NORMAL
PATH REPORT.FINAL DX SPEC: NORMAL

## 2019-07-25 PROCEDURE — 25010000002 PROMETHAZINE PER 50 MG: Performed by: UROLOGY

## 2019-07-25 PROCEDURE — 99024 POSTOP FOLLOW-UP VISIT: CPT | Performed by: UROLOGY

## 2019-07-25 PROCEDURE — 25010000002 GENTAMICIN PER 80 MG: Performed by: UROLOGY

## 2019-07-25 PROCEDURE — 25810000003 SODIUM CHLORIDE 0.9 % WITH KCL 20 MEQ 20-0.9 MEQ/L-% SOLUTION: Performed by: UROLOGY

## 2019-07-25 RX ADMIN — SODIUM CHLORIDE 3000 ML: 900 IRRIGANT IRRIGATION at 03:14

## 2019-07-25 RX ADMIN — POTASSIUM CHLORIDE AND SODIUM CHLORIDE 100 ML/HR: 900; 150 INJECTION, SOLUTION INTRAVENOUS at 02:23

## 2019-07-25 RX ADMIN — SODIUM CHLORIDE 3000 ML: 900 IRRIGANT IRRIGATION at 03:15

## 2019-07-25 RX ADMIN — PANTOPRAZOLE SODIUM 40 MG: 40 TABLET, DELAYED RELEASE ORAL at 06:23

## 2019-07-25 RX ADMIN — PROMETHAZINE HYDROCHLORIDE 12.5 MG: 25 INJECTION INTRAMUSCULAR; INTRAVENOUS at 10:20

## 2019-07-25 RX ADMIN — GENTAMICIN SULFATE 80 MG: 80 INJECTION, SOLUTION INTRAVENOUS at 00:19

## 2019-07-25 RX ADMIN — SODIUM CHLORIDE 3000 ML: 900 IRRIGANT IRRIGATION at 06:33

## 2019-07-25 RX ADMIN — SODIUM CHLORIDE 3000 ML: 900 IRRIGANT IRRIGATION at 06:34

## 2019-07-25 RX ADMIN — LEVOTHYROXINE SODIUM 125 MCG: 125 TABLET ORAL at 06:22

## 2019-07-26 ENCOUNTER — OFFICE VISIT (OUTPATIENT)
Dept: UROLOGY | Facility: CLINIC | Age: 64
End: 2019-07-26

## 2019-07-26 VITALS
HEIGHT: 72 IN | SYSTOLIC BLOOD PRESSURE: 118 MMHG | TEMPERATURE: 98.6 F | BODY MASS INDEX: 23.84 KG/M2 | DIASTOLIC BLOOD PRESSURE: 68 MMHG | WEIGHT: 176 LBS

## 2019-07-26 DIAGNOSIS — N40.1 URINARY RETENTION DUE TO BENIGN PROSTATIC HYPERPLASIA: ICD-10-CM

## 2019-07-26 DIAGNOSIS — N40.0 BENIGN PROSTATIC HYPERPLASIA, UNSPECIFIED WHETHER LOWER URINARY TRACT SYMPTOMS PRESENT: Primary | ICD-10-CM

## 2019-07-26 DIAGNOSIS — R35.0 FREQUENCY OF URINATION: ICD-10-CM

## 2019-07-26 DIAGNOSIS — R33.8 URINARY RETENTION DUE TO BENIGN PROSTATIC HYPERPLASIA: ICD-10-CM

## 2019-07-26 LAB
BILIRUB BLD-MCNC: NEGATIVE MG/DL
CLARITY, POC: ABNORMAL
COLOR UR: ABNORMAL
GLUCOSE UR STRIP-MCNC: NEGATIVE MG/DL
KETONES UR QL: NEGATIVE
LEUKOCYTE EST, POC: ABNORMAL
NITRITE UR-MCNC: NEGATIVE MG/ML
PH UR: 5.5 [PH] (ref 5–8)
PROT UR STRIP-MCNC: ABNORMAL MG/DL
RBC # UR STRIP: ABNORMAL /UL
SP GR UR: 1.02 (ref 1–1.03)
UROBILINOGEN UR QL: NORMAL

## 2019-07-26 PROCEDURE — 81003 URINALYSIS AUTO W/O SCOPE: CPT | Performed by: UROLOGY

## 2019-07-26 PROCEDURE — 51798 US URINE CAPACITY MEASURE: CPT | Performed by: UROLOGY

## 2019-07-26 PROCEDURE — 99024 POSTOP FOLLOW-UP VISIT: CPT | Performed by: UROLOGY

## 2019-07-26 NOTE — PROGRESS NOTES
Chief Complaint:          Chief Complaint   Patient presents with   • hospital follow-up   • Benign Prostatic Hypertrophy       HPI:   64 y.o. male status post a TURP doing well urine is clear he got up every 30 minutes and then every hour he still has a slight amount of blood gave him reassurance I will see him back on Tuesday, July 30 for follow-up      Past Medical History:        Past Medical History:   Diagnosis Date   • Arthritis     lower spine    • Chaudhry esophagus    • GERD (gastroesophageal reflux disease)    • Hiatal hernia    • Hypertension    • Liver disease     cirrhois   • Thyroid disease          Current Meds:     Current Outpatient Medications   Medication Sig Dispense Refill   • amLODIPine-benazepril (LOTREL) 5-20 MG per capsule Take 1 capsule by mouth Every Night.     • Cholecalciferol (VITAMIN D3) 03251 units capsule Take 1 capsule by mouth Every 7 (Seven) Days.     • dutasteride-tamsulosin (CLARK) 0.5-0.4 MG capsule capsule Take 1 capsule by mouth Every Night.     • lansoprazole (PREVACID) 30 MG capsule Take 30 mg by mouth Daily.     • levothyroxine (SYNTHROID) 125 MCG tablet Take 125 mcg by mouth Daily.     • nadolol (CORGARD) 20 MG tablet Take 20 mg by mouth Every Night.     • tamsulosin (FLOMAX) 0.4 MG capsule 24 hr capsule Take 1 capsule by mouth Daily.       No current facility-administered medications for this visit.         Allergies:      No Known Allergies     Past Surgical History:     Past Surgical History:   Procedure Laterality Date   • COLONOSCOPY     • CYSTOSCOPY TRANSURETHRAL RESECTION OF PROSTATE N/A 7/24/2019    Procedure: Cystoscopy and transurethral resection of the prostate, removal of bladder stone;  Surgeon: Deuce Campbell MD;  Location: [facility];  Service: Urology   • ENDOSCOPY     • ESOPHAGEAL VARICES LIGATION     • KIDNEY STONE SURGERY     • SEPTOPLASTY     • TRIGGER FINGER RELEASE Right          Social History:     Social History     Socioeconomic History    • Marital status:      Spouse name: Not on file   • Number of children: Not on file   • Years of education: Not on file   • Highest education level: Not on file   Tobacco Use   • Smoking status: Former Smoker     Packs/day: 0.50     Years: 3.00     Pack years: 1.50     Types: Cigarettes     Last attempt to quit: 1979     Years since quittin.1   • Smokeless tobacco: Never Used   Substance and Sexual Activity   • Alcohol use: Yes     Alcohol/week: 0.6 oz     Types: 1 Cans of beer per week     Frequency: Never     Comment: 1/2 beer every now and then former bourbon drinker several shots per day   • Drug use: No   • Sexual activity: Not Currently     Partners: Female       Family History:     Family History   Problem Relation Age of Onset   • Colon cancer Father    • Cancer Father    • Throat cancer Mother    • Cancer Mother    • Cancer Brother    • Heart disease Paternal Grandfather        Review of Systems:     Review of Systems   Constitutional: Negative.    HENT: Negative.    Eyes: Negative.    Respiratory: Negative.    Cardiovascular: Negative.    Gastrointestinal: Negative.    Endocrine: Negative.    Genitourinary: Positive for frequency and hematuria.   Musculoskeletal: Negative.    Allergic/Immunologic: Negative.    Neurological: Negative.    Hematological: Negative.    Psychiatric/Behavioral: Negative.        Physical Exam:     Physical Exam   Constitutional: He is oriented to person, place, and time. He appears well-developed and well-nourished.   HENT:   Head: Normocephalic and atraumatic.   Eyes: Conjunctivae and EOM are normal. Pupils are equal, round, and reactive to light.   Neck: Normal range of motion.   Cardiovascular: Normal rate, regular rhythm, normal heart sounds and intact distal pulses.   Pulmonary/Chest: Effort normal and breath sounds normal.   Abdominal: Soft. Bowel sounds are normal.   Musculoskeletal: Normal range of motion.   Neurological: He is alert and oriented to  person, place, and time. He has normal reflexes.   Skin: Skin is warm and dry.   Psychiatric: He has a normal mood and affect. His behavior is normal. Judgment and thought content normal.   Nursing note and vitals reviewed.      I have reviewed the following portions of the patient's history: allergies, current medications, past family history, past medical history, past social history, past surgical history, problem list and ROS and confirm it's accurate.      Procedure:       Assessment/Plan:   Urinary retention secondary to BPH status post a successful TURP I will see him back on Tuesday, July 30 for follow-up            Patient's Body mass index is 23.87 kg/m². BMI is within normal parameters. No follow-up required..      Smoking Cessation Counseling:  Never a smoker.  Patient does not currently use any tobacco products.                     This document has been electronically signed by GEOVANNY COYNE MD July 26, 2019 8:06 AM

## 2019-07-30 ENCOUNTER — OFFICE VISIT (OUTPATIENT)
Dept: UROLOGY | Facility: CLINIC | Age: 64
End: 2019-07-30

## 2019-07-30 VITALS
TEMPERATURE: 97.8 F | SYSTOLIC BLOOD PRESSURE: 124 MMHG | DIASTOLIC BLOOD PRESSURE: 70 MMHG | HEIGHT: 72 IN | WEIGHT: 176 LBS | BODY MASS INDEX: 23.84 KG/M2

## 2019-07-30 DIAGNOSIS — R33.8 URINARY RETENTION DUE TO BENIGN PROSTATIC HYPERPLASIA: ICD-10-CM

## 2019-07-30 DIAGNOSIS — N40.1 URINARY RETENTION DUE TO BENIGN PROSTATIC HYPERPLASIA: ICD-10-CM

## 2019-07-30 DIAGNOSIS — R30.9 PAINFUL URINATION: Primary | ICD-10-CM

## 2019-07-30 LAB
BILIRUB BLD-MCNC: ABNORMAL MG/DL
CLARITY, POC: ABNORMAL
COLOR UR: ABNORMAL
GLUCOSE UR STRIP-MCNC: NEGATIVE MG/DL
KETONES UR QL: ABNORMAL
LEUKOCYTE EST, POC: ABNORMAL
NITRITE UR-MCNC: NEGATIVE MG/ML
PH UR: 5 [PH] (ref 5–8)
PROT UR STRIP-MCNC: ABNORMAL MG/DL
RBC # UR STRIP: ABNORMAL /UL
SP GR UR: 1.03 (ref 1–1.03)
UROBILINOGEN UR QL: NORMAL

## 2019-07-30 PROCEDURE — 87086 URINE CULTURE/COLONY COUNT: CPT | Performed by: UROLOGY

## 2019-07-30 PROCEDURE — 81003 URINALYSIS AUTO W/O SCOPE: CPT | Performed by: UROLOGY

## 2019-07-30 PROCEDURE — 99024 POSTOP FOLLOW-UP VISIT: CPT | Performed by: UROLOGY

## 2019-07-30 RX ORDER — PHENAZOPYRIDINE HYDROCHLORIDE 200 MG/1
200 TABLET, FILM COATED ORAL 3 TIMES DAILY PRN
Qty: 60 TABLET | Refills: 11 | Status: SHIPPED | OUTPATIENT
Start: 2019-07-30 | End: 2022-03-04

## 2019-07-30 RX ORDER — HYDROCODONE BITARTRATE AND ACETAMINOPHEN 10; 325 MG/1; MG/1
1 TABLET ORAL EVERY 6 HOURS PRN
Qty: 10 TABLET | Refills: 0 | Status: SHIPPED | OUTPATIENT
Start: 2019-07-30 | End: 2022-03-04

## 2019-07-30 RX ORDER — OXYBUTYNIN CHLORIDE 5 MG/1
5 TABLET ORAL DAILY
Qty: 30 TABLET | Refills: 3 | Status: SHIPPED | OUTPATIENT
Start: 2019-07-30 | End: 2019-08-29

## 2019-07-30 RX ORDER — SULFAMETHOXAZOLE AND TRIMETHOPRIM 800; 160 MG/1; MG/1
1 TABLET ORAL 2 TIMES DAILY
Qty: 42 TABLET | Refills: 0 | Status: SHIPPED | OUTPATIENT
Start: 2019-07-30 | End: 2019-08-01

## 2019-07-30 NOTE — PROGRESS NOTES
Chief Complaint:          Chief Complaint   Patient presents with   • Benign Prostatic Hypertrophy       HPI:   64 y.o. male.  Returns today who is status post a TURP.  He is having a lot of burning which started on Saturday.  There is no fevers or chills.  His stream is improved.  His urine is positive for leukocytes.  His residual is negligible today.  His abdomen is soft and nontender.  I believe this is some postoperative urinary tract infection I started him empirically on an antibiotic.  I gave him some pain pills because he is having significant pain when he urinates I gave him an anticholinergic.  I meant to follow-up with him based on the results of the culture.  Additionally, he has negative path report and a specimen of approximately 33 g.  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     • Cholecalciferol (VITAMIN D3) 53000 units capsule Take 1 capsule by mouth Every 7 (Seven) Days.     • dutasteride-tamsulosin (CLARK) 0.5-0.4 MG capsule capsule Take 1 capsule by mouth Every Night.     • lansoprazole (PREVACID) 30 MG capsule Take 30 mg by mouth Daily.     • levothyroxine (SYNTHROID) 125 MCG tablet Take 125 mcg by mouth Daily.     • nadolol (CORGARD) 20 MG tablet Take 20 mg by mouth Every Night.     • tamsulosin (FLOMAX) 0.4 MG capsule 24 hr capsule Take 1 capsule by mouth Daily.       No current facility-administered medications for this visit.         Allergies:      No Known Allergies     Past Surgical History:      Past Surgical History:   Procedure Laterality Date   • COLONOSCOPY     • CYSTOSCOPY TRANSURETHRAL RESECTION OF PROSTATE N/A 2019    Procedure: Cystoscopy and transurethral resection of the prostate, removal of bladder stone;  Surgeon: Deuce Campbell MD;  Location: [facility];  Service: Urology   • ENDOSCOPY     • ESOPHAGEAL VARICES LIGATION     • KIDNEY STONE SURGERY     • SEPTOPLASTY     • TRIGGER FINGER RELEASE Right          Social History:     Social History     Socioeconomic History   • Marital status:      Spouse name: Not on file   • Number of children: Not on file   • Years of education: Not on file   • Highest education level: Not on file   Tobacco Use   • Smoking status: Former Smoker     Packs/day: 0.50     Years: 3.00     Pack years: 1.50     Types: Cigarettes     Last attempt to quit: 1979     Years since quittin.1   • Smokeless tobacco: Never Used   Substance and Sexual Activity   • Alcohol use: Yes     Alcohol/week: 0.6 oz     Types: 1 Cans of beer per week     Frequency: Never     Comment: 1/2 beer every now and then former bourbon drinker several shots per day   • Drug use: No   • Sexual activity: Not Currently     Partners: Female       Family History:     Family History   Problem Relation Age of Onset   • Colon cancer Father    • Cancer Father    • Throat cancer Mother    • Cancer Mother    • Cancer Brother    • Heart disease Paternal Grandfather        Review of Systems:     Review of Systems   Constitutional: Negative.    HENT: Negative.    Eyes: Negative.    Respiratory: Negative.    Cardiovascular: Negative.    Gastrointestinal: Negative.    Endocrine: Negative.    Genitourinary: Positive for difficulty urinating, dysuria, frequency and urgency.   Musculoskeletal: Negative.    Allergic/Immunologic: Negative.    Neurological: Negative.    Hematological: Negative.    Psychiatric/Behavioral: Negative.        Physical Exam:     Physical Exam   Constitutional: He  is oriented to person, place, and time. He appears well-developed and well-nourished.   HENT:   Head: Normocephalic and atraumatic.   Eyes: Conjunctivae and EOM are normal. Pupils are equal, round, and reactive to light.   Neck: Normal range of motion.   Cardiovascular: Normal rate, regular rhythm, normal heart sounds and intact distal pulses.   Pulmonary/Chest: Effort normal and breath sounds normal.   Abdominal: Soft. Bowel sounds are normal.   Genitourinary: Penis normal.   Musculoskeletal: Normal range of motion.   Neurological: He is alert and oriented to person, place, and time. He has normal reflexes.   Skin: Skin is warm and dry.   Psychiatric: He has a normal mood and affect. His behavior is normal. Judgment and thought content normal.   Nursing note and vitals reviewed.      I have reviewed the following portions of the patient's history: allergies, current medications, past family history, past medical history, past social history, past surgical history, problem list and ROS and confirm it's accurate.      Procedure:       Assessment/Plan:   BPH: Discussed the pathophysiology of BPH and obstruction.  We discussed the static and dynamic effect effects of BPH as well as using 5 alpha reductase inhibitors versus alpha blockade.  We discussed the indications for transurethral surgery as well.  And/ or other therapeutic options available including all of the newer techniques.  He is status post a successful TURP with a negative pathology  Urinary tract infection-started empirically on antibiotics pending the results of his urine culture            Patient's Body mass index is 23.86 kg/m². BMI is within normal parameters. No follow-up required..      Smoking Cessation Counseling:  Never a smoker.  Patient does not currently use any tobacco products.                     This document has been electronically signed by GEOVANNY COYNE MD July 30, 2019 7:59 AM

## 2019-07-31 LAB — BACTERIA SPEC AEROBE CULT: NO GROWTH

## 2019-08-01 ENCOUNTER — TELEPHONE (OUTPATIENT)
Dept: UROLOGY | Facility: CLINIC | Age: 64
End: 2019-08-01

## 2019-08-01 NOTE — TELEPHONE ENCOUNTER
Pt called to notify that he has been having a rash from the Bactrim he had been taking. He stated that he was continuing to have dysuria which will continue for awhile due to his TURP, pyridium has been helping greatly, seeing as his cx was negative for growth, we will not put him on another antibiotic. Updated pts allergy list today.

## 2019-08-15 ENCOUNTER — TRANSCRIBE ORDERS (OUTPATIENT)
Dept: ADMINISTRATIVE | Facility: HOSPITAL | Age: 64
End: 2019-08-15

## 2019-08-15 DIAGNOSIS — R63.4 LOSS OF WEIGHT: Primary | ICD-10-CM

## 2019-08-22 ENCOUNTER — HOSPITAL ENCOUNTER (OUTPATIENT)
Dept: ULTRASOUND IMAGING | Facility: HOSPITAL | Age: 64
Discharge: HOME | End: 2019-08-22
Admitting: INTERNAL MEDICINE

## 2019-08-22 DIAGNOSIS — R63.4 LOSS OF WEIGHT: ICD-10-CM

## 2019-08-22 PROCEDURE — 76700 US EXAM ABDOM COMPLETE: CPT | Performed by: RADIOLOGY

## 2019-08-22 PROCEDURE — 76700 US EXAM ABDOM COMPLETE: CPT

## 2020-06-25 ENCOUNTER — TRANSCRIBE ORDERS (OUTPATIENT)
Dept: ADMINISTRATIVE | Facility: HOSPITAL | Age: 65
End: 2020-06-25

## 2020-06-25 ENCOUNTER — OFFICE VISIT (OUTPATIENT)
Dept: UROLOGY | Facility: CLINIC | Age: 65
End: 2020-06-25

## 2020-06-25 VITALS — BODY MASS INDEX: 24.38 KG/M2 | WEIGHT: 180 LBS | HEIGHT: 72 IN | TEMPERATURE: 98.2 F

## 2020-06-25 DIAGNOSIS — N32.81 DETRUSOR INSTABILITY: ICD-10-CM

## 2020-06-25 DIAGNOSIS — Z20.828 EXPOSURE TO VIRAL DISEASE: Primary | ICD-10-CM

## 2020-06-25 DIAGNOSIS — N40.1 BENIGN PROSTATIC HYPERPLASIA WITH NOCTURIA: ICD-10-CM
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
## 2020-06-25 DIAGNOSIS — R35.1 BENIGN PROSTATIC HYPERPLASIA WITH NOCTURIA: ICD-10-CM

## 2020-06-25 PROCEDURE — 86757 RICKETTSIA ANTIBODY: CPT | Performed by: UROLOGY

## 2020-06-25 PROCEDURE — 86666 EHRLICHIA ANTIBODY: CPT | Performed by: UROLOGY

## 2020-06-25 PROCEDURE — 86618 LYME DISEASE ANTIBODY: CPT | Performed by: UROLOGY

## 2020-06-25 PROCEDURE — 86617 LYME DISEASE ANTIBODY: CPT | Performed by: UROLOGY

## 2020-06-25 PROCEDURE — 99214 OFFICE O/P EST MOD 30 MIN: CPT | Performed by: UROLOGY

## 2020-06-25 RX ORDER — OXYBUTYNIN CHLORIDE 5 MG/1
5 TABLET ORAL DAILY
Qty: 30 TABLET | Refills: 3 | Status: SHIPPED | OUTPATIENT
Start: 2020-06-25 | End: 2020-07-25

## 2020-06-25 NOTE — PROGRESS NOTES
Chief Complaint:          Chief Complaint   Patient presents with   • Groin Swelling       HPI:   65 y.o. male very well-known to me status post a TURP last year with great results he has a fantastic stream he gets up previously every hour with his onset of Clark he gets up every 2 hours he had a tick bite and had significant scrotal swelling and rash that has resolved.  I suspect were dealing with arthropod bite and I went to go ahead and get a appropriate medical profile.  I am to start him on oxybutynin for mild nighttime detrusor instability.  His examination was otherwise unremarkable.    Past Medical History:        Past Medical History:   Diagnosis Date   • Arthritis     lower spine    • Chaudhry esophagus    • GERD (gastroesophageal reflux disease)    • Hiatal hernia    • Hypertension    • Liver disease     cirrhois   • Thyroid disease          Current Meds:     Current Outpatient Medications   Medication Sig Dispense Refill   • amLODIPine-benazepril (LOTREL) 5-20 MG per capsule Take 1 capsule by mouth Every Night.     • Cholecalciferol (VITAMIN D3) 02525 units capsule Take 1 capsule by mouth Every 7 (Seven) Days.     • dutasteride-tamsulosin (CLARK) 0.5-0.4 MG capsule capsule Take 1 capsule by mouth Every Night.     • HYDROcodone-acetaminophen (NORCO)  MG per tablet Take 1 tablet by mouth Every 6 (Six) Hours As Needed for Moderate Pain . 10 tablet 0   • lansoprazole (PREVACID) 30 MG capsule Take 30 mg by mouth Daily.     • levothyroxine (SYNTHROID) 125 MCG tablet Take 125 mcg by mouth Daily.     • nadolol (CORGARD) 20 MG tablet Take 20 mg by mouth Every Night.     • phenazopyridine (PYRIDIUM) 200 MG tablet Take 1 tablet by mouth 3 (Three) Times a Day As Needed for bladder spasms. 60 tablet 11   • tamsulosin (FLOMAX) 0.4 MG capsule 24 hr capsule Take 1 capsule by mouth Daily.       No current facility-administered medications for this visit.         Allergies:      Allergies   Allergen Reactions   •  Bactrim [Sulfamethoxazole-Trimethoprim] Rash        Past Surgical History:     Past Surgical History:   Procedure Laterality Date   • COLONOSCOPY     • CYSTOSCOPY TRANSURETHRAL RESECTION OF PROSTATE N/A 2019    Procedure: Cystoscopy and transurethral resection of the prostate, removal of bladder stone;  Surgeon: Deuce Campbell MD;  Location: [facility];  Service: Urology   • ENDOSCOPY     • ESOPHAGEAL VARICES LIGATION     • KIDNEY STONE SURGERY     • SEPTOPLASTY     • TRIGGER FINGER RELEASE Right          Social History:     Social History     Socioeconomic History   • Marital status:      Spouse name: Not on file   • Number of children: Not on file   • Years of education: Not on file   • Highest education level: Not on file   Tobacco Use   • Smoking status: Former Smoker     Packs/day: 0.50     Years: 3.00     Pack years: 1.50     Types: Cigarettes     Last attempt to quit: 1979     Years since quittin.0   • Smokeless tobacco: Never Used   Substance and Sexual Activity   • Alcohol use: Yes     Alcohol/week: 1.0 standard drinks     Types: 1 Cans of beer per week     Frequency: Never     Comment: 1/2 beer every now and then former bourbon drinker several shots per day   • Drug use: No   • Sexual activity: Not Currently     Partners: Female       Family History:     Family History   Problem Relation Age of Onset   • Colon cancer Father    • Cancer Father    • Throat cancer Mother    • Cancer Mother    • Cancer Brother    • Heart disease Paternal Grandfather        Review of Systems:     Review of Systems   Constitutional: Negative.    HENT: Negative.    Eyes: Negative.    Respiratory: Negative.    Cardiovascular: Negative.    Gastrointestinal: Negative.    Endocrine: Negative.    Musculoskeletal: Negative.    Allergic/Immunologic: Negative.    Neurological: Negative.    Hematological: Negative.    Psychiatric/Behavioral: Negative.        Physical Exam:     Physical Exam   Constitutional:  He is oriented to person, place, and time. He appears well-developed and well-nourished.   HENT:   Head: Normocephalic and atraumatic.   Eyes: Pupils are equal, round, and reactive to light. Conjunctivae and EOM are normal.   Neck: Normal range of motion.   Cardiovascular: Normal rate, regular rhythm, normal heart sounds and intact distal pulses.   Pulmonary/Chest: Effort normal and breath sounds normal.   Abdominal: Soft. Bowel sounds are normal.   Musculoskeletal: Normal range of motion.   Neurological: He is alert and oriented to person, place, and time. He has normal reflexes.   Skin: Skin is warm and dry.   Psychiatric: He has a normal mood and affect. His behavior is normal. Judgment and thought content normal.   Nursing note and vitals reviewed.      I have reviewed the following portions of the patient's history: allergies, current medications, past family history, past medical history, past social history, past surgical history, problem list and ROS and confirm it's accurate.      Procedure:       Assessment/Plan:   BPH: Discussed the pathophysiology of BPH and obstruction.  We discussed the static and dynamic effect effects of BPH as well as using 5 alpha reductase inhibitors versus alpha blockade.  We discussed the indications for transurethral surgery as well.  And/ or other therapeutic options available including all of the newer techniques.  He is status post a TURP with a great stream  Detrusor instability-patient has been diagnosed with detrusor instability which is in irritative bladder symptomatology most likely related to factors such as intake of bladder irritants, postinfectious irritation, prolapse, with a very large differential diagnosis.  The mainstay of treatment has been tight cholinergics which basically caused the bladder to have decreased contractility.  We have discussed the side effects of these treatments including dry mouth, double vision, and increasing constipation.  He has some  component of nighttime instability I am to start him on oxybutynin.  Arthropod bite-he had a tick bite the procedure the scrotal rash and this was in the genital area.  He does have a prior history of Lyme disease as well            Patient's There is no height or weight on file to calculate BMI. BMI is within normal parameters. No follow-up required..              This document has been electronically signed by GEOVANNY COYNE MD June 25, 2020 15:32

## 2020-06-26 ENCOUNTER — LAB (OUTPATIENT)
Dept: LAB | Facility: HOSPITAL | Age: 65
End: 2020-06-26

## 2020-06-26 DIAGNOSIS — Z20.828 EXPOSURE TO VIRAL DISEASE: ICD-10-CM

## 2020-06-26 PROBLEM — N32.81 DETRUSOR INSTABILITY: Status: ACTIVE | Noted: 2020-06-26
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
## 2020-06-26 PROCEDURE — U0004 COV-19 TEST NON-CDC HGH THRU: HCPCS

## 2020-06-26 PROCEDURE — C9803 HOPD COVID-19 SPEC COLLECT: HCPCS

## 2020-06-26 PROCEDURE — U0002 COVID-19 LAB TEST NON-CDC: HCPCS

## 2020-06-27 LAB
B BURGDOR IGG SER QL: NEGATIVE
B BURGDOR IGM SER QL: NEGATIVE
REF LAB TEST METHOD: NORMAL
SARS-COV-2 RNA RESP QL NAA+PROBE: NOT DETECTED

## 2020-06-30 LAB
R RICKETTSI IGG SER QL IA: ABNORMAL
R RICKETTSI IGG SER QL IA: POSITIVE
R RICKETTSI IGM TITR SER: 0.64 INDEX (ref 0–0.89)

## 2020-07-02 LAB
A PHAGOCYTOPH IGM TITR SER IF: NEGATIVE {TITER}
B BURGDOR IGG PATRN SER IB-IMP: NEGATIVE
B BURGDOR IGG+IGM SER-ACNC: 2.29 ISR (ref 0–0.9)
B BURGDOR IGM PATRN SER IB-IMP: NEGATIVE
B BURGDOR IGM SER-ACNC: <0.8 INDEX (ref 0–0.79)
B BURGDOR18KD IGG SER QL IB: ABNORMAL
B BURGDOR23KD IGG SER QL IB: ABNORMAL
B BURGDOR23KD IGM SER QL IB: ABNORMAL
B BURGDOR28KD IGG SER QL IB: ABNORMAL
B BURGDOR30KD IGG SER QL IB: PRESENT
B BURGDOR39KD IGG SER QL IB: ABNORMAL
B BURGDOR39KD IGM SER QL IB: ABNORMAL
B BURGDOR41KD IGG SER QL IB: PRESENT
B BURGDOR41KD IGM SER QL IB: ABNORMAL
B BURGDOR45KD IGG SER QL IB: ABNORMAL
B BURGDOR58KD IGG SER QL IB: ABNORMAL
B BURGDOR66KD IGG SER QL IB: ABNORMAL
B BURGDOR93KD IGG SER QL IB: ABNORMAL
CONV HGE IGG TITER: NEGATIVE
E CHAFFEENSIS IGG TITR SER IF: NEGATIVE {TITER}
E. CHAFFEENSIS (HME) IGM TITER: NEGATIVE

## 2020-07-16 ENCOUNTER — OFFICE VISIT (OUTPATIENT)
Dept: UROLOGY | Facility: CLINIC | Age: 65
End: 2020-07-16

## 2020-07-16 VITALS — TEMPERATURE: 97.6 F | WEIGHT: 180 LBS | HEIGHT: 72 IN | BODY MASS INDEX: 24.38 KG/M2
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
## 2020-07-16 DIAGNOSIS — N32.81 DETRUSOR INSTABILITY: ICD-10-CM

## 2020-07-16 PROCEDURE — 99213 OFFICE O/P EST LOW 20 MIN: CPT | Performed by: UROLOGY

## 2020-07-16 RX ORDER — DOXYCYCLINE HYCLATE 100 MG/1
100 CAPSULE ORAL 2 TIMES DAILY
Qty: 42 CAPSULE | Refills: 1 | Status: SHIPPED | OUTPATIENT
Start: 2020-07-16 | End: 2020-07-30

## 2020-07-16 RX ORDER — DOXYCYCLINE HYCLATE 100 MG/1
100 CAPSULE ORAL DAILY
Qty: 42 CAPSULE | Refills: 1 | Status: SHIPPED | OUTPATIENT
Start: 2020-07-16 | End: 2020-07-16

## 2020-07-16 NOTE — PROGRESS NOTES
Chief Complaint:          Chief Complaint   Patient presents with   • Testicle Pain       HPI:   65 y.o. male returns today he is well-known to me status post a TURP with great results that he developed some scrotal swelling.  I suspect an arthropod bite check appropriate serum titers and found that he is positive for both Lyme disease and Enoc Mountain spotted fever with regards to the nighttime detrusor instability it is been dramatic and is completely better I placed him on doxycycline we discussed the ramifications of an arthropod bite and I will see him back in 4 weeks      Past Medical History:        Past Medical History:   Diagnosis Date   • Arthritis     lower spine    • Chaudhry esophagus    • GERD (gastroesophageal reflux disease)    • Hiatal hernia    • Hypertension    • Liver disease     cirrhois   • Thyroid disease          Current Meds:     Current Outpatient Medications   Medication Sig Dispense Refill   • amLODIPine-benazepril (LOTREL) 5-20 MG per capsule Take 1 capsule by mouth Every Night.     • Cholecalciferol (VITAMIN D3) 76598 units capsule Take 1 capsule by mouth Every 7 (Seven) Days.     • dutasteride-tamsulosin (CLARK) 0.5-0.4 MG capsule capsule Take 1 capsule by mouth Every Night.     • HYDROcodone-acetaminophen (NORCO)  MG per tablet Take 1 tablet by mouth Every 6 (Six) Hours As Needed for Moderate Pain . 10 tablet 0   • lansoprazole (PREVACID) 30 MG capsule Take 30 mg by mouth Daily.     • levothyroxine (SYNTHROID) 125 MCG tablet Take 125 mcg by mouth Daily.     • nadolol (CORGARD) 20 MG tablet Take 20 mg by mouth Every Night.     • oxybutynin (DITROPAN) 5 MG tablet Take 1 tablet by mouth Daily for 30 days. 30 tablet 3   • phenazopyridine (PYRIDIUM) 200 MG tablet Take 1 tablet by mouth 3 (Three) Times a Day As Needed for bladder spasms. 60 tablet 11   • tamsulosin (FLOMAX) 0.4 MG capsule 24 hr capsule Take 1 capsule by mouth Daily.       No current facility-administered medications  for this visit.         Allergies:      Allergies   Allergen Reactions   • Bactrim [Sulfamethoxazole-Trimethoprim] Rash        Past Surgical History:     Past Surgical History:   Procedure Laterality Date   • COLONOSCOPY     • CYSTOSCOPY TRANSURETHRAL RESECTION OF PROSTATE N/A 2019    Procedure: Cystoscopy and transurethral resection of the prostate, removal of bladder stone;  Surgeon: Deuce Campbell MD;  Location: [facility];  Service: Urology   • ENDOSCOPY     • ESOPHAGEAL VARICES LIGATION     • KIDNEY STONE SURGERY     • SEPTOPLASTY     • TRIGGER FINGER RELEASE Right          Social History:     Social History     Socioeconomic History   • Marital status:      Spouse name: Not on file   • Number of children: Not on file   • Years of education: Not on file   • Highest education level: Not on file   Tobacco Use   • Smoking status: Former Smoker     Packs/day: 0.50     Years: 3.00     Pack years: 1.50     Types: Cigarettes     Last attempt to quit: 1979     Years since quittin.1   • Smokeless tobacco: Never Used   Substance and Sexual Activity   • Alcohol use: Yes     Alcohol/week: 1.0 standard drinks     Types: 1 Cans of beer per week     Frequency: Never     Comment: 1/2 beer every now and then former bourbon drinker several shots per day   • Drug use: No   • Sexual activity: Not Currently     Partners: Female       Family History:     Family History   Problem Relation Age of Onset   • Colon cancer Father    • Cancer Father    • Throat cancer Mother    • Cancer Mother    • Cancer Brother    • Heart disease Paternal Grandfather        Review of Systems:     Review of Systems   Constitutional: Negative.    HENT: Negative.    Eyes: Negative.    Respiratory: Negative.    Cardiovascular: Negative.    Gastrointestinal: Negative.    Endocrine: Negative.    Musculoskeletal: Negative.    Allergic/Immunologic: Negative.    Neurological: Negative.    Hematological: Negative.       Psychiatric/Behavioral: Negative.        Physical Exam:     Physical Exam   Constitutional: He is oriented to person, place, and time. He appears well-developed and well-nourished.   HENT:   Head: Normocephalic and atraumatic.   Eyes: Pupils are equal, round, and reactive to light. Conjunctivae and EOM are normal.   Neck: Normal range of motion.   Cardiovascular: Normal rate, regular rhythm, normal heart sounds and intact distal pulses.   Pulmonary/Chest: Effort normal and breath sounds normal.   Abdominal: Soft. Bowel sounds are normal.   Genitourinary:   Genitourinary Comments: No rashes   Musculoskeletal: Normal range of motion.   Neurological: He is alert and oriented to person, place, and time. He has normal reflexes.   Skin: Skin is warm and dry.   Psychiatric: He has a normal mood and affect. His behavior is normal. Judgment and thought content normal.   Nursing note and vitals reviewed.      I have reviewed the following portions of the patient's history: allergies, current medications, past family history, past medical history, past social history, past surgical history, problem list and ROS and confirm it's accurate.      Procedure:       Assessment/Plan:   Detrusor instability-patient has been diagnosed with detrusor instability which is in irritative bladder symptomatology most likely related to factors such as intake of bladder irritants, postinfectious irritation, prolapse, with a very large differential diagnosis.  The mainstay of treatment has been tight cholinergics which basically caused the bladder to have decreased contractility.  We have discussed the side effects of these treatments including dry mouth, double vision, and increasing constipation.  Anticholinergic medication-we are recommending the use of an anticholinergic.  We discussed the class of drugs.  We discussed the older drug such as oxybutynin with his increased spectrum of side effects such as dry mouth, dry eyes constipation  versus the newer medications with lower side effects but more difficult to obtain via insurance and much more expensive finally the newest class of drugs which is Myrbetriq which has a very favorable side effect profile but unfortunately is prohibitively expensive and oftentimes requires precertification of which may be unsuccessful in many other cases  Arthropod bite secondary encounter positive titers for both Lyme disease and Enoc Mountain spotted fever started on doxycycline            Patient's Body mass index is 24.41 kg/m². BMI is within normal parameters. No follow-up required..              This document has been electronically signed by GEOVANNY COYNE MD July 16, 2020 13:35

## 2020-08-13 ENCOUNTER — OFFICE VISIT (OUTPATIENT)
Dept: UROLOGY | Facility: CLINIC | Age: 65
End: 2020-08-13

## 2020-08-13 VITALS — TEMPERATURE: 98.4 F | BODY MASS INDEX: 24.38 KG/M2 | HEIGHT: 72 IN | WEIGHT: 180 LBS

## 2020-08-13 DIAGNOSIS — N40.1 BENIGN PROSTATIC HYPERPLASIA WITH NOCTURIA: Primary | ICD-10-CM

## 2020-08-13 DIAGNOSIS — R35.1 BENIGN PROSTATIC HYPERPLASIA WITH NOCTURIA: Primary | ICD-10-CM
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
## 2020-08-13 PROCEDURE — 99213 OFFICE O/P EST LOW 20 MIN: CPT | Performed by: UROLOGY

## 2020-08-13 RX ORDER — OXYBUTYNIN CHLORIDE 5 MG/1
TABLET ORAL EVERY 12 HOURS SCHEDULED
COMMUNITY
End: 2020-10-22 | Stop reason: SDUPTHER

## 2020-08-13 NOTE — PROGRESS NOTES
Chief Complaint:          Chief Complaint   Patient presents with   • Anthropod Bite     continued diarrhea after abx treatment       HPI:   65 y.o. 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    Past Medical History:        Past Medical History:   Diagnosis Date   • Arthritis     lower spine    • Chaudhry esophagus    • GERD (gastroesophageal reflux disease)    • Hiatal hernia    • Hypertension    • Liver disease     cirrhois   • Thyroid disease          Current Meds:     Current Outpatient Medications   Medication Sig Dispense Refill   • amLODIPine-benazepril (LOTREL) 5-20 MG per capsule Take 1 capsule by mouth Every Night.     • Cholecalciferol (VITAMIN D3) 40066 units capsule Take 1 capsule by mouth Every 7 (Seven) Days.     • lansoprazole (PREVACID) 30 MG capsule Take 30 mg by mouth Daily.     • levothyroxine (SYNTHROID) 125 MCG tablet Take 125 mcg by mouth Daily.     • nadolol (CORGARD) 20 MG tablet Take 20 mg by mouth Every Night.     • oxybutynin (DITROPAN) 5 MG tablet Every 12 (Twelve) Hours.     • dutasteride-tamsulosin (CLARK) 0.5-0.4 MG capsule capsule Take 1 capsule by mouth Every Night.     • HYDROcodone-acetaminophen (NORCO)  MG per tablet Take 1 tablet by mouth Every 6 (Six) Hours As Needed for Moderate Pain . 10 tablet 0   • phenazopyridine (PYRIDIUM) 200 MG tablet Take 1 tablet by mouth 3 (Three) Times a Day As Needed for bladder spasms. 60 tablet 11   • tamsulosin (FLOMAX) 0.4 MG capsule 24 hr capsule Take 1 capsule by mouth Daily.       No current facility-administered medications for this visit.         Allergies:      Allergies   Allergen Reactions   • Bactrim [Sulfamethoxazole-Trimethoprim] Rash           Past Surgical History:     Past Surgical History:   Procedure Laterality Date   • COLONOSCOPY     • CYSTOSCOPY TRANSURETHRAL RESECTION OF PROSTATE N/A 2019    Procedure: Cystoscopy and transurethral resection of the prostate, removal of bladder stone;  Surgeon: Deuce Campbell MD;  Location: [facility];  Service: Urology   • ENDOSCOPY     • ESOPHAGEAL VARICES LIGATION     • KIDNEY STONE SURGERY     • SEPTOPLASTY     • TRIGGER FINGER RELEASE Right          Social History:     Social History     Socioeconomic History   • Marital status:      Spouse name: Not on file   • Number of children: Not on file   • Years of education: Not on file   • Highest education level: Not on file   Tobacco Use   • Smoking status: Former Smoker     Packs/day: 0.50     Years: 3.00     Pack years: 1.50     Types: Cigarettes     Last attempt to quit: 1979     Years since quittin.1   • Smokeless tobacco: Never Used   Substance and Sexual Activity   • Alcohol use: Yes     Alcohol/week: 1.0 standard drinks     Types: 1 Cans of beer per week     Frequency: Never     Comment: 1/2 beer every now and then former bourbon drinker several shots per day   • Drug use: No   • Sexual activity: Not Currently     Partners: Female       Family History:     Family History   Problem Relation Age of Onset   • Colon cancer Father    • Cancer Father    • Throat cancer Mother    • Cancer Mother    • Cancer Brother    • Heart disease Paternal Grandfather        Review of Systems:     Review of Systems   Constitutional: Negative.    HENT: Negative.    Eyes: Negative.    Respiratory: Negative.    Cardiovascular: Negative.    Gastrointestinal: Negative.    Endocrine: Negative.    Musculoskeletal: Negative.    Allergic/Immunologic: Negative.    Neurological: Negative.    Hematological: Negative.    Psychiatric/Behavioral: Negative.        Physical Exam:     Physical Exam   Constitutional: He is oriented to person, place, and time. He  appears well-developed and well-nourished.   HENT:   Head: Normocephalic and atraumatic.   Eyes: Pupils are equal, round, and reactive to light. Conjunctivae and EOM are normal.   Neck: Normal range of motion.   Cardiovascular: Normal rate, regular rhythm, normal heart sounds and intact distal pulses.   Pulmonary/Chest: Effort normal and breath sounds normal.   Abdominal: Soft. Bowel sounds are normal.   Musculoskeletal: Normal range of motion.   Neurological: He is alert and oriented to person, place, and time. He has normal reflexes.   Skin: Skin is warm and dry.   Psychiatric: He has a normal mood and affect. His behavior is normal. Judgment and thought content normal.   Nursing note and vitals reviewed.      I have reviewed the following portions of the patient's history: allergies, current medications, past family history, past medical history, past social history, past surgical history, problem list and ROS and confirm it's accurate.      Procedure:       Assessment/Plan:   BPH: Discussed the pathophysiology of BPH and obstruction.  We discussed the static and dynamic effect effects of BPH as well as using 5 alpha reductase inhibitors versus alpha blockade.  We discussed the indications for transurethral surgery as well.  And/ or other therapeutic options available including all of the newer techniques.  Status post successful TURP  Arthropod bite-secondary encounter completed a course of doxycycline            Patient's Body mass index is 24.41 kg/m². BMI is within normal parameters. No follow-up required..              This document has been electronically signed by GEOVANNY COYNE MD August 13, 2020 10:01

## 2020-10-22 DIAGNOSIS — N40.1 BENIGN PROSTATIC HYPERPLASIA WITH NOCTURIA: Primary | ICD-10-CM

## 2020-10-22 DIAGNOSIS — R35.1 BENIGN PROSTATIC HYPERPLASIA WITH NOCTURIA: Primary | ICD-10-CM

## 2020-10-22 RX ORDER — OXYBUTYNIN CHLORIDE 5 MG/1
5 TABLET ORAL DAILY
Qty: 30 TABLET | Refills: 6 | Status: SHIPPED | OUTPATIENT
Start: 2020-10-22 | End: 2022-04-14 | Stop reason: SDUPTHER

## 2020-12-11 ENCOUNTER — TRANSCRIBE ORDERS (OUTPATIENT)
Dept: ADMINISTRATIVE | Facility: HOSPITAL | Age: 65
End: 2020-12-11

## 2020-12-11 DIAGNOSIS — Z01.818 OTHER SPECIFIED PRE-OPERATIVE EXAMINATION: Primary | ICD-10-CM

## 2020-12-15 ENCOUNTER — LAB (OUTPATIENT)
Dept: LAB | Facility: HOSPITAL | Age: 65
End: 2020-12-15

## 2020-12-15 DIAGNOSIS — Z01.818 OTHER SPECIFIED PRE-OPERATIVE EXAMINATION: ICD-10-CM

## 2020-12-15 LAB — SARS-COV-2 RNA RESP QL NAA+PROBE: NOT DETECTED

## 2020-12-15 PROCEDURE — C9803 HOPD COVID-19 SPEC COLLECT: HCPCS

## 2020-12-15 PROCEDURE — U0004 COV-19 TEST NON-CDC HGH THRU: HCPCS | Performed by: INTERNAL MEDICINE

## 2021-02-19 ENCOUNTER — OFFICE VISIT (OUTPATIENT)
Dept: UROLOGY | Facility: CLINIC | Age: 66
End: 2021-02-19

## 2021-02-19 VITALS — BODY MASS INDEX: 24.37 KG/M2 | WEIGHT: 179.9 LBS | HEIGHT: 72 IN | TEMPERATURE: 98.1 F

## 2021-02-19 DIAGNOSIS — R33.8 URINARY RETENTION DUE TO BENIGN PROSTATIC HYPERPLASIA: ICD-10-CM

## 2021-02-19 DIAGNOSIS — N32.81 DETRUSOR INSTABILITY: ICD-10-CM

## 2021-02-19 DIAGNOSIS — R35.1 BENIGN PROSTATIC HYPERPLASIA WITH NOCTURIA: Primary | ICD-10-CM

## 2021-02-19 DIAGNOSIS — N40.1 URINARY RETENTION DUE TO BENIGN PROSTATIC HYPERPLASIA: ICD-10-CM

## 2021-02-19 DIAGNOSIS — N40.1 BENIGN PROSTATIC HYPERPLASIA WITH NOCTURIA: Primary | ICD-10-CM
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
## 2021-02-19 PROCEDURE — 84153 ASSAY OF PSA TOTAL: CPT | Performed by: UROLOGY

## 2021-02-19 PROCEDURE — 99214 OFFICE O/P EST MOD 30 MIN: CPT | Performed by: UROLOGY

## 2021-02-19 NOTE — PROGRESS NOTES
Chief Complaint:          Chief Complaint   Patient presents with   • [place] MOUNTAIN FEVER     6 MTH FOLLOW UP       HPI:   65 y.o. male status post a TURP which went well has persistent detrusor instability and developed [place] spotted fever postoperatively is doing better he has no more complaints joint pain rashes etc. he voids great no nocturia PSA is pending I will see him back in a year.      Past Medical History:        Past Medical History:   Diagnosis Date   • Arthritis     lower spine    • Chaudhry esophagus    • GERD (gastroesophageal reflux disease)    • Hiatal hernia    • Hypertension    • Liver disease     cirrhois   • Thyroid disease          Current Meds:     Current Outpatient Medications   Medication Sig Dispense Refill   • amLODIPine-benazepril (LOTREL) 5-20 MG per capsule Take 1 capsule by mouth Every Night.     • Cholecalciferol (VITAMIN D3) 04309 units capsule Take 1 capsule by mouth Every 7 (Seven) Days.     • dutasteride-tamsulosin (CLARK) 0.5-0.4 MG capsule capsule Take 1 capsule by mouth Every Night.     • HYDROcodone-acetaminophen (NORCO)  MG per tablet Take 1 tablet by mouth Every 6 (Six) Hours As Needed for Moderate Pain . 10 tablet 0   • lansoprazole (PREVACID) 30 MG capsule Take 30 mg by mouth Daily.     • levothyroxine (SYNTHROID) 125 MCG tablet Take 125 mcg by mouth Daily.     • nadolol (CORGARD) 20 MG tablet Take 20 mg by mouth Every Night.     • oxybutynin (DITROPAN) 5 MG tablet Take 1 tablet by mouth Daily. 30 tablet 6   • phenazopyridine (PYRIDIUM) 200 MG tablet Take 1 tablet by mouth 3 (Three) Times a Day As Needed for bladder spasms. 60 tablet 11   • tamsulosin (FLOMAX) 0.4 MG capsule 24 hr capsule Take 1 capsule by mouth Daily.       No current facility-administered medications for this visit.         Allergies:      Allergies   Allergen Reactions   • Bactrim [Sulfamethoxazole-Trimethoprim] Rash        Past Surgical History:     Past Surgical History:   Procedure  Laterality Date   • COLONOSCOPY     • CYSTOSCOPY TRANSURETHRAL RESECTION OF PROSTATE N/A 2019    Procedure: Cystoscopy and transurethral resection of the prostate, removal of bladder stone;  Surgeon: Deuce Campbell MD;  Location: [facility];  Service: Urology   • ENDOSCOPY     • ESOPHAGEAL VARICES LIGATION     • KIDNEY STONE SURGERY     • SEPTOPLASTY     • TRIGGER FINGER RELEASE Right          Social History:     Social History     Socioeconomic History   • Marital status:      Spouse name: Not on file   • Number of children: Not on file   • Years of education: Not on file   • Highest education level: Not on file   Tobacco Use   • Smoking status: Former Smoker     Packs/day: 0.50     Years: 3.00     Pack years: 1.50     Types: Cigarettes     Quit date: 1979     Years since quittin.7   • Smokeless tobacco: Never Used   Substance and Sexual Activity   • Alcohol use: Yes     Alcohol/week: 1.0 standard drinks     Types: 1 Cans of beer per week     Frequency: Never     Comment: 1/2 beer every now and then former bourbon drinker several shots per day   • Drug use: No   • Sexual activity: Not Currently     Partners: Female       Family History:     Family History   Problem Relation Age of Onset   • Colon cancer Father    • Cancer Father    • Throat cancer Mother    • Cancer Mother    • Cancer Brother    • Heart disease Paternal Grandfather        Review of Systems:     Review of Systems   Constitutional: Negative.    HENT: Negative.    Eyes: Negative.    Respiratory: Negative.    Cardiovascular: Negative.    Gastrointestinal: Negative.    Endocrine: Negative.    Musculoskeletal: Negative.    Allergic/Immunologic: Negative.    Neurological: Negative.    Hematological: Negative.    Psychiatric/Behavioral: Negative.        Physical Exam:     Physical Exam  Vitals signs and nursing note reviewed.   Constitutional:       Appearance: He is well-developed.   HENT:      Head: Normocephalic and  atraumatic.   Eyes:      Conjunctiva/sclera: Conjunctivae normal.      Pupils: Pupils are equal, round, and reactive to light.   Neck:      Musculoskeletal: Normal range of motion.   Cardiovascular:      Rate and Rhythm: Normal rate and regular rhythm.      Heart sounds: Normal heart sounds.   Pulmonary:      Effort: Pulmonary effort is normal.      Breath sounds: Normal breath sounds.   Abdominal:      General: Bowel sounds are normal.      Palpations: Abdomen is soft.   Musculoskeletal: Normal range of motion.   Skin:     General: Skin is warm and dry.   Neurological:      Mental Status: He is alert and oriented to person, place, and time.      Deep Tendon Reflexes: Reflexes are normal and symmetric.   Psychiatric:         Behavior: Behavior normal.         Thought Content: Thought content normal.         Judgment: Judgment normal.         I have reviewed the following portions of the patient's history: allergies, current medications, past family history, past medical history, past social history, past surgical history, problem list and ROS and confirm it's accurate.      Procedure:       Assessment/Plan:   [place] spotted fever-in remission  Detrusor instability-patient has been diagnosed with detrusor instability which is in irritative bladder symptomatology most likely related to factors such as intake of bladder irritants, postinfectious irritation, prolapse, with a very large differential diagnosis.  The mainstay of treatment has been tight cholinergics which basically caused the bladder to have decreased contractility.  We have discussed the side effects of these treatments including dry mouth, double vision, and increasing constipation.  Anticholinergic medication-we are recommending the use of an anticholinergic.  We discussed the class of drugs.  We discussed the older drug such as oxybutynin with his increased spectrum of side effects such as dry mouth, dry eyes constipation versus the newer  medications with lower side effects but more difficult to obtain via insurance and much more expensive finally the newest class of drugs which is Myrbetriq which has a very favorable side effect profile but unfortunately is prohibitively expensive and oftentimes requires precertification of which may be unsuccessful in many other cases  BPH: Discussed the pathophysiology of BPH and obstruction.  We discussed the static and dynamic effect effects of BPH as well as using 5 alpha reductase inhibitors versus alpha blockade.  We discussed the indications for transurethral surgery as well.  And/ or other therapeutic options available including all of the newer techniques.  Status post a successful TURP  PSA testing-I am recommending a PSA blood test that stands for prostate specific antigen.  I discussed the pathophysiology of PSA testing indicating its use in the diagnosis and management of prostate cancer.  I discussed the normal range being 0-4 but more appropriately being much closer to 0-2 in a normal male.  I discussed the fact that after certain age we don't recommend PSA testing especially in view of numerous comorbidities.  That this will not be a useful test.  I discussed many of the things that can artificially raised PSA including a recent infection, urinary tract infection, recent sexual intercourse.  Where even the type of movement such as manipulation of the prostate  riding a bicycle.  After all this is taken into account when the test is reviewed  the most important use of PSA which is the velocity measurement in other words the change of PSA with time as a very important factor in the use and that we look for greater than 20% rise over a year to help us make the prediction of prostate cancer.  I also discussed that the use with prostate cancer indicating that after a radical prostatectomy the PSA should be 0 and any rise indicates an early biochemical recurrence            Patient's Body mass index is  24.39 kg/m². BMI is within normal parameters. No follow-up required..              This document has been electronically signed by GEOVANNY COYNE MD February 19, 2021 13:53 EST

## 2021-02-20 LAB — PSA SERPL-MCNC: 0.91 NG/ML (ref 0–4)

## 2021-02-22 DIAGNOSIS — Z23 IMMUNIZATION DUE: ICD-10-CM

## 2021-03-01 ENCOUNTER — IMMUNIZATION (OUTPATIENT)
Dept: VACCINE CLINIC | Facility: HOSPITAL | Age: 66
End: 2021-03-01

## 2021-03-01 PROCEDURE — 91300 HC SARSCOV02 VAC 30MCG/0.3ML IM: CPT | Performed by: INTERNAL MEDICINE

## 2021-03-01 PROCEDURE — 0001A: CPT | Performed by: INTERNAL MEDICINE

## 2021-03-22 ENCOUNTER — IMMUNIZATION (OUTPATIENT)
Dept: VACCINE CLINIC | Facility: HOSPITAL | Age: 66
End: 2021-03-22

## 2021-03-22 PROCEDURE — 91300 HC SARSCOV02 VAC 30MCG/0.3ML IM: CPT | Performed by: INTERNAL MEDICINE

## 2021-03-22 PROCEDURE — 0002A: CPT | Performed by: INTERNAL MEDICINE

## 2021-06-24 ENCOUNTER — TELEPHONE (OUTPATIENT)
Dept: UROLOGY | Facility: CLINIC | Age: 66
End: 2021-06-24

## 2021-10-21 ENCOUNTER — IMMUNIZATION (OUTPATIENT)
Dept: VACCINE CLINIC | Facility: HOSPITAL | Age: 66
End: 2021-10-21

## 2021-10-21 PROCEDURE — 0004A ADM SARSCOV2 30MCG/0.3ML BOOSTER: CPT | Performed by: INTERNAL MEDICINE

## 2021-10-21 PROCEDURE — 91300 HC SARSCOV02 VAC 30MCG/0.3ML IM: CPT | Performed by: INTERNAL MEDICINE

## 2022-02-17 ENCOUNTER — OFFICE VISIT (OUTPATIENT)
Dept: UROLOGY | Facility: CLINIC | Age: 67
End: 2022-02-17

## 2022-02-17 VITALS — BODY MASS INDEX: 24.24 KG/M2 | HEIGHT: 72 IN | WEIGHT: 179 LBS

## 2022-02-17 DIAGNOSIS — N32.81 DETRUSOR INSTABILITY: ICD-10-CM

## 2022-02-17 DIAGNOSIS — R35.1 BENIGN PROSTATIC HYPERPLASIA WITH NOCTURIA: ICD-10-CM

## 2022-02-17 DIAGNOSIS — N40.1 BENIGN PROSTATIC HYPERPLASIA WITH NOCTURIA: ICD-10-CM

## 2022-02-17 DIAGNOSIS — N40.1 URINARY RETENTION DUE TO BENIGN PROSTATIC HYPERPLASIA: Primary | ICD-10-CM

## 2022-02-17 DIAGNOSIS — R33.8 URINARY RETENTION DUE TO BENIGN PROSTATIC HYPERPLASIA: Primary | ICD-10-CM

## 2022-02-17 PROCEDURE — 99213 OFFICE O/P EST LOW 20 MIN: CPT | Performed by: UROLOGY

## 2022-02-17 NOTE — PROGRESS NOTES
Chief Complaint:          Chief Complaint   Patient presents with   • arthropod bite     yearly follow up        HPI:   66 y.o. male turns today.  Doing well nights are okay is worse if he drinks [employer] he has a history of [place] spotted fever his last PSA on 1/27/2021 was 1.1.  He reports no lower urinary tract symptomatology, particularly irritative symptoms such as frequency, urgency, dysuria, and obstructive symptomatology, particularly dribbling, hesitancy, and intermittency.      Past Medical History:        Past Medical History:   Diagnosis Date   • Arthritis     lower spine    • Chaudhry esophagus    • GERD (gastroesophageal reflux disease)    • Hiatal hernia    • Hypertension    • Liver disease     cirrhois   • Thyroid disease          Current Meds:     Current Outpatient Medications   Medication Sig Dispense Refill   • amLODIPine-benazepril (LOTREL) 5-20 MG per capsule Take 1 capsule by mouth Every Night.     • Cholecalciferol (VITAMIN D3) 09856 units capsule Take 1 capsule by mouth Every 7 (Seven) Days.     • dutasteride-tamsulosin (CLARK) 0.5-0.4 MG capsule capsule Take 1 capsule by mouth Every Night.     • HYDROcodone-acetaminophen (NORCO)  MG per tablet Take 1 tablet by mouth Every 6 (Six) Hours As Needed for Moderate Pain . 10 tablet 0   • lansoprazole (PREVACID) 30 MG capsule Take 30 mg by mouth Daily.     • levothyroxine (SYNTHROID) 125 MCG tablet Take 125 mcg by mouth Daily.     • nadolol (CORGARD) 20 MG tablet Take 20 mg by mouth Every Night.     • oxybutynin (DITROPAN) 5 MG tablet Take 1 tablet by mouth Daily. 30 tablet 6   • phenazopyridine (PYRIDIUM) 200 MG tablet Take 1 tablet by mouth 3 (Three) Times a Day As Needed for bladder spasms. 60 tablet 11   • tamsulosin (FLOMAX) 0.4 MG capsule 24 hr capsule Take 1 capsule by mouth Daily.       No current facility-administered medications for this visit.        Allergies:      Allergies   Allergen Reactions   • Bactrim  [Sulfamethoxazole-Trimethoprim] Rash        Past Surgical History:     Past Surgical History:   Procedure Laterality Date   • COLONOSCOPY     • CYSTOSCOPY TRANSURETHRAL RESECTION OF PROSTATE N/A 2019    Procedure: Cystoscopy and transurethral resection of the prostate, removal of bladder stone;  Surgeon: Deuce Campbell MD;  Location: [facility];  Service: Urology   • ENDOSCOPY     • ESOPHAGEAL VARICES LIGATION     • KIDNEY STONE SURGERY     • SEPTOPLASTY     • TRIGGER FINGER RELEASE Right          Social History:     Social History     Socioeconomic History   • Marital status:    Tobacco Use   • Smoking status: Former Smoker     Packs/day: 0.50     Years: 3.00     Pack years: 1.50     Types: Cigarettes     Quit date: 1979     Years since quittin.7   • Smokeless tobacco: Never Used   Vaping Use   • Vaping Use: Never used   Substance and Sexual Activity   • Alcohol use: Yes     Alcohol/week: 1.0 standard drink     Types: 1 Cans of beer per week     Comment: 1/2 beer every now and then former bourbon drinker several shots per day   • Drug use: No   • Sexual activity: Not Currently     Partners: Female       Family History:     Family History   Problem Relation Age of Onset   • Colon cancer Father    • Cancer Father    • Throat cancer Mother    • Cancer Mother    • Cancer Brother    • Heart disease Paternal Grandfather        Review of Systems:     Review of Systems   Constitutional: Negative.    HENT: Negative.    Eyes: Negative.    Respiratory: Negative.    Cardiovascular: Negative.    Gastrointestinal: Negative.    Endocrine: Negative.    Musculoskeletal: Negative.    Allergic/Immunologic: Negative.    Neurological: Negative.    Hematological: Negative.    Psychiatric/Behavioral: Negative.        Physical Exam:     Physical Exam  Vitals and nursing note reviewed.   Constitutional:       Appearance: He is well-developed.   HENT:      Head: Normocephalic and atraumatic.   Eyes:       Conjunctiva/sclera: Conjunctivae normal.      Pupils: Pupils are equal, round, and reactive to light.   Cardiovascular:      Rate and Rhythm: Normal rate and regular rhythm.      Heart sounds: Normal heart sounds.   Pulmonary:      Effort: Pulmonary effort is normal.      Breath sounds: Normal breath sounds.   Abdominal:      General: Bowel sounds are normal.      Palpations: Abdomen is soft.   Musculoskeletal:         General: Normal range of motion.      Cervical back: Normal range of motion.   Skin:     General: Skin is warm and dry.   Neurological:      Mental Status: He is alert and oriented to person, place, and time.      Deep Tendon Reflexes: Reflexes are normal and symmetric.   Psychiatric:         Behavior: Behavior normal.         Thought Content: Thought content normal.         Judgment: Judgment normal.         I have reviewed the following portions of the patient's history: Allergies, current medications, past family history, past medical history, past social history, past surgical history, problem list, and ROS and confirm it is accurate.      Procedure:       Assessment/Plan:   BPH: Discussed the pathophysiology of BPH and obstruction.  We discussed the static and dynamic effects of BPH as well as using 5 alpha reductase inhibitors versus alpha blockade.  We discussed the indications for transurethral surgery as well and/ or other therapeutic options available including all of the newer techniques.                This document has been electronically signed by GEOVANNY COYNE MD February 17, 2022 09:57 EST

## 2022-03-04 ENCOUNTER — HOSPITAL ENCOUNTER (OUTPATIENT)
Dept: GENERAL RADIOLOGY | Facility: HOSPITAL | Age: 67
Discharge: HOME | End: 2022-03-04
Admitting: NURSE PRACTITIONER

## 2022-03-04 ENCOUNTER — OFFICE VISIT (OUTPATIENT)
Dept: UROLOGY | Facility: CLINIC | Age: 67
End: 2022-03-04

## 2022-03-04 VITALS — HEIGHT: 72 IN | BODY MASS INDEX: 24.24 KG/M2 | WEIGHT: 179 LBS

## 2022-03-04 DIAGNOSIS — R31.29 MICROHEMATURIA: ICD-10-CM

## 2022-03-04 DIAGNOSIS — N32.81 DETRUSOR INSTABILITY: Primary | ICD-10-CM

## 2022-03-04 DIAGNOSIS — R10.9 BILATERAL FLANK PAIN: ICD-10-CM

## 2022-03-04 DIAGNOSIS — N40.1 BENIGN PROSTATIC HYPERPLASIA WITH NOCTURIA: ICD-10-CM

## 2022-03-04 DIAGNOSIS — R35.1 BENIGN PROSTATIC HYPERPLASIA WITH NOCTURIA: ICD-10-CM

## 2022-03-04 LAB
BILIRUB BLD-MCNC: NEGATIVE MG/DL
CLARITY, POC: CLEAR
COLOR UR: YELLOW
EXPIRATION DATE: ABNORMAL
GLUCOSE UR STRIP-MCNC: NEGATIVE MG/DL
KETONES UR QL: NEGATIVE
LEUKOCYTE EST, POC: NEGATIVE
Lab: ABNORMAL
NITRITE UR-MCNC: NEGATIVE MG/ML
PH UR: 7 [PH] (ref 5–8)
PROT UR STRIP-MCNC: ABNORMAL MG/DL
RBC # UR STRIP: ABNORMAL /UL
SP GR UR: 1.01 (ref 1–1.03)
UROBILINOGEN UR QL: NORMAL

## 2022-03-04 PROCEDURE — 74018 RADEX ABDOMEN 1 VIEW: CPT

## 2022-03-04 PROCEDURE — 81003 URINALYSIS AUTO W/O SCOPE: CPT | Performed by: NURSE PRACTITIONER

## 2022-03-04 PROCEDURE — 74018 RADEX ABDOMEN 1 VIEW: CPT | Performed by: RADIOLOGY

## 2022-03-04 PROCEDURE — 87086 URINE CULTURE/COLONY COUNT: CPT | Performed by: NURSE PRACTITIONER

## 2022-03-04 PROCEDURE — 51798 US URINE CAPACITY MEASURE: CPT | Performed by: NURSE PRACTITIONER

## 2022-03-04 PROCEDURE — 99214 OFFICE O/P EST MOD 30 MIN: CPT | Performed by: NURSE PRACTITIONER

## 2022-03-04 RX ORDER — DOXYCYCLINE HYCLATE 100 MG/1
100 CAPSULE ORAL 2 TIMES DAILY
COMMUNITY

## 2022-03-04 NOTE — PROGRESS NOTES
Chief Complaint  Benign Prostatic Hypertrophy (follow up ) and Difficulty Urinating    Subjective          Jose Maier presents to [facility]  History of Present Illness  Mr. 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  His most recent PSA was 0.912, 102/19/21.  He is unsure about his prior PSA results.  He is on therapy with Flomax, patient denies any side effects from medication.     The patient returns to clinic today for evaluation.  This is his 2-week follow-up, patient was last evaluated in clinic 02/17/222 with Dr. Campbell, during which he reported doing well, only getting worse only if he drank Coca-Cola..  He also denied having any lower urinary tract symptomatology, particularly irritative symptoms such as frequency, urgency, dysuria, and obstructive symptomatology, particularly dribbling, hesitancy, and intermittency.    On his exam today, patient reports he has been miserable the last 2 days with persistent issues with detrusor instability, currently reports urine frequency symptoms, burning with urination, and nocturia.  Denies urgency, he denies any episodes of gross hematuria.  He is currently on doxycycline 100 mg, reports minimal improvement so far.  He has been on medications for the last 3 weeks.  His urine dipstick today nevertheless is completely negative for any infection, it is negative for gross pressure 3+ microscopic hematuria, 1+ proteinuria.  His PVR is 0 cc.    Besides nocturia 3-4 times and burning when urinating, he does not have recurrent UTIs, denies any penile discharge.  Denies pelvic pressure or supra pubic discomfort.  He has a good stream post drinking multiple beers he reports, he denies any post void dribbling.  Denies abdominal, flank pain and also denies CVA tenderness. No N/V/D. Denies  "chills or fevers. He is also post [place] spotted fever 1 year ago.  He denies any joint pains today, has not had any recent rashes.    We will reviewed/discussed his KUB results today which are unremarkable for any kidney stones.  The rest of his PMHx as listed below.    Objective   Vital Signs:   Ht 182.9 cm (72\")   Wt 81.2 kg (179 lb)   BMI 24.28 kg/m²     Physical Exam  Constitutional:       General: He is in acute distress.      Appearance: He is well-developed.   HENT:      Head: Normocephalic and atraumatic.      Right Ear: External ear normal.      Left Ear: External ear normal.   Eyes:      General:         Right eye: No discharge.         Left eye: No discharge.      Conjunctiva/sclera: Conjunctivae normal.      Pupils: Pupils are equal, round, and reactive to light.   Neck:      Thyroid: No thyromegaly.      Trachea: No tracheal deviation.   Cardiovascular:      Rate and Rhythm: Normal rate and regular rhythm.      Heart sounds: No murmur heard.    No friction rub.   Pulmonary:      Effort: Pulmonary effort is normal. No respiratory distress.      Breath sounds: Normal breath sounds. No stridor.   Abdominal:      General: Bowel sounds are normal. There is distension.      Palpations: Abdomen is soft.      Tenderness: There is abdominal tenderness. There is guarding.   Genitourinary:     Penis: Normal and uncircumcised. No tenderness or discharge.       Testes: Normal.      Rectum: Normal. Guaiac result negative.      Comments: Reports dysuria, nocturia  Musculoskeletal:         General: Tenderness present. No deformity. Normal range of motion.      Cervical back: Normal range of motion and neck supple.   Skin:     General: Skin is warm and dry.      Capillary Refill: Capillary refill takes less than 2 seconds.      Coloration: Skin is pale.   Neurological:      Mental Status: He is alert and oriented to person, place, and time.      Cranial Nerves: No cranial nerve deficit.      Motor: Weakness " present.      Coordination: Coordination normal.   Psychiatric:         Behavior: Behavior normal.         Thought Content: Thought content normal.         Judgment: Judgment normal.        Result Review :     UA    Urinalysis 3/4/22   Ketones, UA Negative   Leukocytes, UA Negative           Urine Culture    Urine Culture 3/4/22   Urine Culture No growth               Data reviewed: Radiologic studies KUB done 03/4/22-unremarkable          Assessment and Plan    Diagnoses and all orders for this visit:    1. Detrusor instability (Primary)  -     POC Urinalysis Dipstick, Automated  -     Urine Culture - Urine, Urine, Clean Catch  -     XR abdomen kub    2. Microhematuria  -     XR abdomen kub    3. Benign prostatic hyperplasia with nocturia  -     XR abdomen kub    4. Bilateral flank pain  -     XR abdomen kub    Other orders  -     Bladder Scan                                                                PLAN  BPH: WE Discussed the pathophysiology of BPH and obstruction. We discussed the static and dynamic effects of BPH as well as using 5 alpha reductase inhibitors versus alpha blockade.  We discussed the indications for transurethral surgery as well.  And/ or other therapeutic options available including all of the newer techniques.  He has no real symptomatology referable to his prostate other than moderate enlargement.  Rather than proceed with an expensive workup he doesn't need, at this time I am recommending he continue with an alpha blocker tamsulosin 0.4 mg capsule daily and avoiding bladder irritants such as Coca-Cola, coffee, teas,     WE Discussed maximal medical therapy with finasteride and tamsulosin and how each medication works I explained that finasteride would reduce the size of the prostate by 20-30% reduce his PSA by 50% reduce the risks of either surgery or urinary retention by 50% and may reduce the risk of prostate cancer well-differentiated by 20-30% however he may increase the risk of  poorly differentiated prostate cancer by half to 1%.  I also explained how  Flomax relaxes the prostate, and can often cause retrograde ejaculation.  Patient denies any side effects from medication at this point.      Again, we discussed detrusor instability which is an  irritative bladder symptomatology most likely related to factors such as intake of bladder irritants, postinfectious irritation, prolapse, with a very large differential diagnosis.  The mainstay of treatment has been tight cholinergics which basically caused the bladder to have decreased contractility.  We have discussed the side effects of these treatments including dry mouth, double vision, and increasing constipation.  She reports doing well  on oxybutynin for symptomatic relief.        PLAN  We will resend his urine for culture, I will call him with results if any positive bacterial growth resistant to his current therapy with doxycycline.    Encourage increasing p.o. fluid intake, at least 1 to 2 L daily, and avoiding bladder irritants    Discussed things he can do to help his urine frequency/NOCTURIA episodes such as his weight control, keeping a bladder diary with strict intake and output of what he eats or drinks, how often she urinates, how much she urinates.  He has been encouraged to avoid bladder irritants such as caffeine products, coffee, tea, citrus/spicy foods.    He has also been encouraged to follow a timed voiding bladder training program, such as limiting the amount of time between bathroom breaks, using the bathroom at regular intervals such as every 2-3 hours to prevent overflow incontinence.  Encouraged to try using techniques to suppress bladder urges, such as focusing on guided imagery, relaxation techniques on comfort measures.     Also discussed pelvic floor muscle exercises, and do Keagle exercises to strengthen the muscles to help control urination.    Patient/wife is agreeable plan of care.    Follow-up in 1 year as  discussed, sooner if need be.    Patient is agreeable plan of care.    Follow Up   Return in 1 year (on 2/16/2023), or if symptoms worsen or fail to improve, for Next scheduled follow up BPH WITH DYSURIA/, RECURRENT UTI/DYSURIA/DETRUSSOR INSTABILITY ZACHARIAH Campbell.     Patient was given instructions and counseling regarding his condition or for health maintenance advice. Please see specific information pulled into the AVS if appropriate.

## 2022-03-05 LAB — BACTERIA SPEC AEROBE CULT: NO GROWTH

## 2022-03-08 NOTE — EXTERNAL PATIENT INSTRUCTIONS
Patient Education   Table of Contents       Benign Prostatic Hyperplasia       Dysuria     To view videos and all your education online visit,   [email or web address]   or scan this QR code with your smartphone.                  Benign Prostatic Hyperplasia        Benign prostatic hyperplasia (BPH) is an enlarged prostate gland that is caused by the normal aging process and not by cancer. The prostate is a walnut-sized gland that is involved in the production of semen. It is located in front of the rectum and below the bladder. The bladder stores urine and the urethra is the tube that carries the urine out of the body. The prostate may get bigger as a man gets older.   An enlarged prostate can press on the urethra. This can make it harder to pass urine. The build-up of urine in the bladder can cause infection. Back pressure and infection may progress to bladder damage and kidney (renal) failure.     What are the causes?   This condition is part of a normal aging process. However, not all men develop problems from this condition. If the prostate enlarges away from the urethra, urine flow will not be blocked. If it enlarges toward the urethra and compresses it, there will be problems passing urine.   What increases the risk?   This condition is more likely to develop in men over the age of 50 years.   What are the signs or symptoms?    Symptoms of this condition include:       Getting up often during the night to urinate.       Needing to urinate frequently during the day.       Difficulty starting urine flow.       Decrease in size and strength of your urine stream.       Leaking (dribbling) after urinating.       Inability to pass urine. This needs immediate treatment.       Inability to completely empty your bladder.       Pain when you pass urine. This is more common if there is also an infection.       Urinary tract infection (UTI).     How is this diagnosed?    This condition is diagnosed based on your  medical history, a physical exam, and your symptoms. Tests will also be done, such as:       A post-void bladder scan. This measures any amount of urine that may remain in your bladder after you finish urinating.       A digital rectal exam. In a rectal exam, your health care provider checks your prostate by putting a lubricated, gloved finger into your rectum to feel the back of your prostate gland. This exam detects the size of your gland and any abnormal lumps or growths.       An exam of your urine (urinalysis).       A prostate specific antigen (PSA) screening. This is a blood test used to screen for prostate cancer.       An ultrasound. This test uses sound waves to electronically produce a picture of your prostate gland.     Your health care provider may refer you to a specialist in kidney and prostate diseases (urologist).   How is this treated?    Once symptoms begin, your health care provider will monitor your condition (active surveillance or watchful waiting). Treatment for this condition will depend on the severity of your condition. Treatment may include:       Observation and yearly exams. This may be the only treatment needed if your condition and symptoms are mild.      Medicines to relieve your symptoms, including:       Medicines to shrink the prostate.       Medicines to relax the muscle of the prostate.      Surgery in severe cases. Surgery may include:       Prostatectomy. In this procedure, the prostate tissue is removed completely through an open incision or with a laparoscope or robotics.       Transurethral resection of the prostate (TURP). In this procedure, a tool is inserted through the opening at the tip of the penis (urethra). It is used to cut away tissue of the inner core of the prostate. The pieces are removed through the same opening of the penis. This removes the blockage.       Transurethral incision (TUIP). In this procedure, small cuts are made in the prostate. This lessens the  prostate's pressure on the urethra.       Transurethral microwave thermotherapy (TUMT). This procedure uses microwaves to create heat. The heat destroys and removes a small amount of prostate tissue.       Transurethral needle ablation (TUNA). This procedure uses radio frequencies to destroy and remove a small amount of prostate tissue.       Interstitial laser coagulation (ILC). This procedure uses a laser to destroy and remove a small amount of prostate tissue.       Transurethral electrovaporization (TUVP). This procedure uses electrodes to destroy and remove a small amount of prostate tissue.       Prostatic urethral lift. This procedure inserts an implant to push the lobes of the prostate away from the urethra.     Follow these instructions at home:         Take over-the-counter and prescription medicines only as told by your health care provider.       Monitor your symptoms for any changes. Contact your health care provider with any changes.       Avoid drinking large amounts of liquid before going to bed or out in public.       Avoid or reduce how much caffeine or alcohol you drink.       Give yourself time when you urinate.       Keep all follow-up visits as told by your health care provider. This is important.     Contact a health care provider if:         You have unexplained back pain.       Your symptoms do not get better with treatment.       You develop side effects from the medicine you are taking.       Your urine becomes very dark or has a bad smell.       Your lower abdomen becomes distended and you have trouble passing your urine.     Get help right away if:         You have a fever or chills.       You suddenly cannot urinate.       You feel lightheaded, or very dizzy, or you faint.       There are large amounts of blood or clots in the urine.       Your urinary problems become hard to manage.       You develop moderate to severe low back or flank pain. The flank is the side of your body between  the ribs and the hip.     These symptoms may represent a serious problem that is an emergency. Do not wait to see if the symptoms will go away. Get medical help right away. Call your local emergency services (911 in the [place] Do not drive yourself to the hospital.   Summary         Benign prostatic hyperplasia (BPH) is an enlarged prostate that is caused by the normal aging process and not by cancer.       An enlarged prostate can press on the urethra. This can make it hard to pass urine.       This condition is part of a normal aging process and is more likely to develop in men over the age of 50 years.       Get help right away if you suddenly cannot urinate.     This information is not intended to replace advice given to you by your health care provider. Make sure you discuss any questions you have with your health care provider.     Document Released: 12/18/2006Document Revised: 08/26/2021Document Reviewed: 08/26/2021     Elsevier Patient Education ? 2021 [employer]         Dysuria     Dysuria is pain or discomfort during urination. The pain or discomfort may be felt in the part of the body that drains urine from the bladder (urethra) or in the surrounding tissue of the genitals. The pain may also be felt in the groin area, lower abdomen, or lower back.    You may have to urinate frequently or have the sudden feeling that you have to urinate (urgency). Dysuria can affect anyone, but it is more common in females. Dysuria can be caused by many different things, including:       Urinary tract infection.       Kidney stones or bladder stones.       Certain STIs (sexually transmitted infections), such as chlamydia.       Dehydration.       Inflammation of the tissues of the vagina.       Use of certain medicines.       Use of certain soaps or scented products that cause irritation.     Follow these instructions at home:   Medicines         Take over-the-counter and prescription medicines only as told by your  health care provider.       If you were prescribed an antibiotic medicine, take it as told by your health care provider. Do not  stop taking the antibiotic even if you start to feel better.     Eating and drinking            Drink enough fluid to keep your urine pale yellow.       Avoid caffeinated beverages, tea, and alcohol. These beverages can irritate the bladder and make dysuria worse. In males, alcohol may irritate the prostate.     General instructions         Watch your condition for any changes.       Urinate often. Avoid holding urine for long periods of time.       If you are female, you should wipe from front to back after urinating or having a bowel movement. Use each piece of toilet paper only once.       Empty your bladder after sex.       Keep all follow-up visits. This is important.       If you had any tests done to find the cause of dysuria, it is up to you to get your test results. Ask your health care provider, or the department that is doing the test, when your results will be ready.       Contact a health care provider if:         You have a fever.       You develop pain in your back or sides.       You have nausea or vomiting.       You have blood in your urine.       You are not urinating as often as you usually do.     Get help right away if:         Your pain is severe and not relieved with medicines.       You cannot eat or drink without vomiting.       You are confused.       You have a rapid heartbeat while resting.       You have shaking or chills.       You feel extremely weak.     Summary         Dysuria is pain or discomfort while urinating. Many different conditions can lead to dysuria.       If you have dysuria, you may have to urinate frequently or have the sudden feeling that you have to urinate (urgency).       Watch your condition for any changes. Keep all follow-up visits.       Make sure that you urinate often and drink enough fluid to keep your urine pale yellow.     This  information is not intended to replace advice given to you by your health care provider. Make sure you discuss any questions you have with your health care provider.     Document Released: 09/15/2005Document Revised: 07/30/2021Document Reviewed: 07/30/2021     Elsevier Patient Education ? 2021 [employer]

## 2022-04-14 ENCOUNTER — OFFICE VISIT (OUTPATIENT)
Dept: UROLOGY | Facility: CLINIC | Age: 67
End: 2022-04-14

## 2022-04-14 VITALS — WEIGHT: 179 LBS | HEIGHT: 72 IN | BODY MASS INDEX: 24.24 KG/M2

## 2022-04-14 DIAGNOSIS — R35.0 FREQUENCY OF URINATION: Primary | ICD-10-CM

## 2022-04-14 DIAGNOSIS — N40.1 BENIGN PROSTATIC HYPERPLASIA WITH NOCTURIA: ICD-10-CM

## 2022-04-14 DIAGNOSIS — R35.1 BENIGN PROSTATIC HYPERPLASIA WITH NOCTURIA: ICD-10-CM

## 2022-04-14 DIAGNOSIS — R31.0 GROSS HEMATURIA: ICD-10-CM

## 2022-04-14 DIAGNOSIS — N32.81 DETRUSOR INSTABILITY: ICD-10-CM

## 2022-04-14 DIAGNOSIS — N39.0 RECURRENT UTI: ICD-10-CM

## 2022-04-14 LAB
BILIRUB BLD-MCNC: NEGATIVE MG/DL
CLARITY, POC: CLEAR
COLOR UR: NORMAL
EXPIRATION DATE: NORMAL
GLUCOSE UR STRIP-MCNC: NEGATIVE MG/DL
KETONES UR QL: NEGATIVE
LEUKOCYTE EST, POC: NEGATIVE
Lab: NORMAL
NITRITE UR-MCNC: NEGATIVE MG/ML
PH UR: 6.5 [PH] (ref 5–8)
PROT UR STRIP-MCNC: NEGATIVE MG/DL
RBC # UR STRIP: NEGATIVE /UL
SP GR UR: 1.01 (ref 1–1.03)
UROBILINOGEN UR QL: NORMAL

## 2022-04-14 PROCEDURE — 99214 OFFICE O/P EST MOD 30 MIN: CPT | Performed by: NURSE PRACTITIONER

## 2022-04-14 PROCEDURE — 87086 URINE CULTURE/COLONY COUNT: CPT | Performed by: NURSE PRACTITIONER

## 2022-04-14 PROCEDURE — 81003 URINALYSIS AUTO W/O SCOPE: CPT | Performed by: NURSE PRACTITIONER

## 2022-04-14 RX ORDER — OXYBUTYNIN CHLORIDE 5 MG/1
5 TABLET ORAL DAILY
Qty: 90 TABLET | Refills: 5 | Status: SHIPPED | OUTPATIENT
Start: 2022-04-14 | End: 2022-09-12 | Stop reason: SDUPTHER

## 2022-04-14 RX ORDER — PHENAZOPYRIDINE HYDROCHLORIDE 200 MG/1
200 TABLET, FILM COATED ORAL 3 TIMES DAILY PRN
Qty: 20 TABLET | Refills: 0 | Status: SHIPPED | OUTPATIENT
Start: 2022-04-14 | End: 2022-05-23

## 2022-04-14 NOTE — PROGRESS NOTES
Chief Complaint  BPH WITH DYSURIA/BURNING WITH URINATION (ONE MONTH FOLLOW UP)    Subjective          Jose Maier presents to [facility]  History of Present Illness    Mr. Jose MAIER is a very pleasant 66-year-old male patient, with a significant history of BPH post TURP by Dr. 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  His most recent PSA was 0.912, on 02/19/2021.  He is unsure about his prior PSA results.  He is on therapy with Flomax, patient denies any side effects from medication.      The patient returns to clinic today for evaluation.  This is his 4week follow-up, patient was last evaluated last month, during which he reported doing well, only getting worse only if he drank Coca-Cola..  He also denied having any lower urinary tract symptomatology, particularly irritative symptoms such as frequency, urgency, dysuria, and obstructive symptomatology, particularly dribbling, hesitancy, and intermittency.     On his exam today, patient reports he has been again miserable the last week with with persistent issues with detrusor instability, currently reports improving dysuria following 3-day therapy with Azo.  Today, he denies any urine frequency symptoms, but still reports intermittent burning with urination, and nocturia.  Denies urgency, he denies any episodes of gross hematuria recently.  He is currently on doxycycline 100 mg, reports improvement so far.  He has been on medications for suppressive therapy with doxycycline the last 8 weeks.  His urine dipstick today nevertheless is completely negative for any infection, it is negative for gross / microscopic hematuria,  His PVR is 0 cc, his IPSS score is 9     Besides nocturia 3-4 times and burning when urinating, he does not have recurrent UTIs, denies any penile discharge.  Denies pelvic pressure or supra pubic discomfort.  He has a good stream post  "drinking multiple beers he reports, he denies any post void dribbling.  Denies abdominal, flank pain and also denies CVA tenderness. No N/V/D. Denies chills or fevers. He is also post Enoc [place] spotted fever 1 year ago.  He denies any joint pains today, has not had any recent rashes.    His last KUB results where unremarkable for any kidney stones.      The rest of his PMHx as listed below.     Objective   Vital Signs:   Ht 182.9 cm (72\")   Wt 81.2 kg (179 lb)   BMI 24.28 kg/m²     BMI is within normal parameters. No follow-up required.      Physical Exam  Constitutional:       General: He is in acute distress.      Appearance: He is well-developed.   HENT:      Head: Normocephalic and atraumatic.      Right Ear: External ear normal.      Left Ear: External ear normal.   Eyes:      General:         Right eye: No discharge.         Left eye: No discharge.      Conjunctiva/sclera: Conjunctivae normal.      Pupils: Pupils are equal, round, and reactive to light.   Neck:      Thyroid: No thyromegaly.      Trachea: No tracheal deviation.   Cardiovascular:      Rate and Rhythm: Normal rate and regular rhythm.      Heart sounds: No murmur heard.    No friction rub.   Pulmonary:      Effort: Pulmonary effort is normal. No respiratory distress.      Breath sounds: Normal breath sounds. No stridor.   Abdominal:      General: Bowel sounds are normal. There is no distension.      Palpations: Abdomen is soft.      Tenderness: There is abdominal tenderness. There is no guarding.   Genitourinary:     Penis: Normal and uncircumcised. No tenderness or discharge.       Testes: Normal.      Rectum: Normal. Guaiac result negative.      Comments: Reports constant dysuria, burning with urination, intermittent frequency with urgency  Musculoskeletal:         General: Tenderness present. No deformity. Normal range of motion.      Cervical back: Normal range of motion and neck supple.   Skin:     General: Skin is warm and dry.      " Capillary Refill: Capillary refill takes less than 2 seconds.      Coloration: Skin is not pale.   Neurological:      Mental Status: He is alert and oriented to person, place, and time.      Cranial Nerves: No cranial nerve deficit.      Coordination: Coordination normal.   Psychiatric:         Behavior: Behavior normal.         Thought Content: Thought content normal.         Judgment: Judgment normal.      IPSS Questionnaire (AUA-7):  Over the past month…    1)  How often have you had a sensation of not emptying your bladder completely after you finish urinating?  3 - About half the time   2)  How often have you had to urinate again less than two hours after you finished urinating? 2 - Less than half the time   3)  How often have you found you stopped and started again several times when you urinated?  1 - Less than 1 time in 5   4) How difficult have you found it to postpone urination?  3 - About half the time   5) How often have you had a weak urinary stream?  1 - Less than 1 time in 5   6) How often have you had to push or strain to begin urination?  1 - Less than 1 time in 5   7) How many times did you most typically get up to urinate from the time you went to bed until the time you got up in the morning?  3 - 3 times   Total score:  0-7 mildly symptomatic    8-19 moderately symptomatic                                          14    20-35 severely symptomatic       Result Review :     UA    Urinalysis 3/4/22 4/14/22   Ketones, UA Negative Negative   Leukocytes, UA Negative Negative           Urine Culture    Urine Culture 3/4/22   Urine Culture No growth                         Assessment and Plan    Diagnoses and all orders for this visit:    1. Frequency of urination (Primary)  -     POC Urinalysis Dipstick, Automated  -     oxybutynin (DITROPAN) 5 MG tablet; Take 1 tablet by mouth Daily.  Dispense: 90 tablet; Refill: 5  -     phenazopyridine (Pyridium) 200 MG tablet; Take 1 tablet by mouth 3 (Three) Times a  Day As Needed for Bladder Spasms.  Dispense: 20 tablet; Refill: 0    2. Recurrent UTI  -     Urine Culture - Urine, Urine, Clean Catch  -     oxybutynin (DITROPAN) 5 MG tablet; Take 1 tablet by mouth Daily.  Dispense: 90 tablet; Refill: 5  -     phenazopyridine (Pyridium) 200 MG tablet; Take 1 tablet by mouth 3 (Three) Times a Day As Needed for Bladder Spasms.  Dispense: 20 tablet; Refill: 0    3. Benign prostatic hyperplasia with nocturia  -     oxybutynin (DITROPAN) 5 MG tablet; Take 1 tablet by mouth Daily.  Dispense: 90 tablet; Refill: 5  -     phenazopyridine (Pyridium) 200 MG tablet; Take 1 tablet by mouth 3 (Three) Times a Day As Needed for Bladder Spasms.  Dispense: 20 tablet; Refill: 0  -     CT Abdomen Pelvis Stone Protocol; Future  -     PSA Total+% Free (Serial)    4. Detrusor instability  -     oxybutynin (DITROPAN) 5 MG tablet; Take 1 tablet by mouth Daily.  Dispense: 90 tablet; Refill: 5  -     phenazopyridine (Pyridium) 200 MG tablet; Take 1 tablet by mouth 3 (Three) Times a Day As Needed for Bladder Spasms.  Dispense: 20 tablet; Refill: 0  -     CT Abdomen Pelvis Stone Protocol; Future  -     PSA Total+% Free (Serial)    5. Gross hematuria  -     oxybutynin (DITROPAN) 5 MG tablet; Take 1 tablet by mouth Daily.  Dispense: 90 tablet; Refill: 5  -     phenazopyridine (Pyridium) 200 MG tablet; Take 1 tablet by mouth 3 (Three) Times a Day As Needed for Bladder Spasms.  Dispense: 20 tablet; Refill: 0  -     CT Abdomen Pelvis Stone Protocol; Future                                                    PLAN  BPH with dysuria/detrusor instability/acute cystitis with hematuria/UTI  Mr. Jose ORANTES is a very pleasant 66-year-old male patient, with a significant history of BPH post TURP by Dr. 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  Reports constant burning with urination, with increased concerns about kidney stones.  Patient reports she has  had hematuria.  His urine dipstick today is completely negative for any infection, it is negative for gross/microscopic hematuria. His most recent PSA was 0.912, on 02/19/2021.     BPH: WE Discussed the pathophysiology of BPH and obstruction.   We discussed the static and dynamic effects of BPH as well as using 5 alpha reductase inhibitors versus alpha blockade.  We discussed the indications for transurethral surgery as well.  And/ or other therapeutic options available including all of the newer techniques.  He has no real symptomatology referable to his prostate other than moderate enlargement.  Rather than proceed with an expensive workup he doesn't need, at this time I am recommending he continue with an alpha blocker tamsulosin 0.4 mg capsule daily and avoiding bladder irritants such as Coca-Cola, coffee, teas, patient stopped Flomax and also stopped the Ailin     Again, we discussed detrusor instability which is an  irritative bladder symptomatology most likely related to factors such as intake of bladder irritants, postinfectious irritation, prolapse, with a very large differential diagnosis.  The mainstay of treatment has been tight cholinergics which basically caused the bladder to have decreased contractility.  We have discussed the side effects of these treatments including dry mouth, double vision, and increasing constipation.  She reports doing well  on oxybutynin for symptomatic relief.                                          PLAN  We will resend his urine for culture, I will call him with results if any positive bacterial growth resistant to his current therapy with doxycycline.     Encourage increasing p.o. fluid intake, at least 1 to 2 L daily, and avoiding bladder irritants especially with cola products     Discussed things he can do to help his urine frequency/NOCTURIA episodes such as his weight control, keeping a bladder diary with strict intake and output of what he eats or drinks, how often  she urinates, how much she urinates.  He has been encouraged to avoid bladder irritants such as caffeine products, coffee, tea, citrus/spicy foods.     He has also been encouraged to follow a timed voiding bladder training program, such as limiting the amount of time between bathroom breaks, using the bathroom at regular intervals such as every 2-3 hours to prevent overflow incontinence.  Encouraged to try using techniques to suppress bladder urges, such as focusing on guided imagery, relaxation techniques on comfort measures.     We discussed upper tract investigation, patient has been scheduled for CT abdomen and pelvis renal stone protocol.    We discussed lower tract investigation, patient has been scheduled for cystoscopy on 05/19/2022 with Dr. Campbell-detrusor instability/dysuria     Also discussed pelvic floor muscle exercises, and do Keagle exercises to strengthen the muscles to help control urination.     Patient is agreeable plan of care.     Follow-up with Dr. Campbell as discussed, to review CT scan/cystoscopy    May return sooner if need be.     Patient is agreeable plan of care.     Follow Up     Follow Up   Return in about 5 weeks (around 5/19/2022) for Next scheduled follow up, With Dr. Campbell, Cystoscopy for BPH with dysuria/, Review CT Scan.  Patient was given instructions and counseling regarding his condition or for health maintenance advice. Please see specific information pulled into the AVS if appropriate.

## 2022-04-15 LAB — BACTERIA SPEC AEROBE CULT: NO GROWTH

## 2022-05-16 ENCOUNTER — HOSPITAL ENCOUNTER (OUTPATIENT)
Dept: CT IMAGING | Facility: HOSPITAL | Age: 67
Discharge: HOME | End: 2022-05-16
Admitting: NURSE PRACTITIONER

## 2022-05-16 DIAGNOSIS — N32.81 DETRUSOR INSTABILITY: ICD-10-CM

## 2022-05-16 DIAGNOSIS — R31.0 GROSS HEMATURIA: ICD-10-CM

## 2022-05-16 DIAGNOSIS — N40.1 BENIGN PROSTATIC HYPERPLASIA WITH NOCTURIA: ICD-10-CM

## 2022-05-16 DIAGNOSIS — R35.1 BENIGN PROSTATIC HYPERPLASIA WITH NOCTURIA: ICD-10-CM

## 2022-05-16 PROCEDURE — 74176 CT ABD & PELVIS W/O CONTRAST: CPT | Performed by: RADIOLOGY

## 2022-05-16 PROCEDURE — 74176 CT ABD & PELVIS W/O CONTRAST: CPT

## 2022-05-19 ENCOUNTER — PROCEDURE VISIT (OUTPATIENT)
Dept: UROLOGY | Facility: CLINIC | Age: 67
End: 2022-05-19

## 2022-05-19 VITALS — WEIGHT: 179.01 LBS | HEIGHT: 72 IN | BODY MASS INDEX: 24.25 KG/M2

## 2022-05-19 DIAGNOSIS — N21.0 BLADDER CALCULUS: Primary | ICD-10-CM

## 2022-05-19 PROCEDURE — 99213 OFFICE O/P EST LOW 20 MIN: CPT | Performed by: UROLOGY

## 2022-05-20 PROBLEM — N21.0 BLADDER CALCULUS: Status: ACTIVE | Noted: 2022-05-20

## 2022-05-20 RX ORDER — GENTAMICIN SULFATE 80 MG/100ML
80 INJECTION, SOLUTION INTRAVENOUS ONCE
Status: CANCELLED | OUTPATIENT
Start: 2022-05-20 | End: 2022-05-20

## 2022-05-23 ENCOUNTER — PRE-ADMISSION TESTING (OUTPATIENT)
Dept: PREADMISSION TESTING | Facility: HOSPITAL | Age: 67
End: 2022-05-23

## 2022-05-23 DIAGNOSIS — N21.0 BLADDER CALCULUS: Primary | ICD-10-CM

## 2022-05-23 LAB
ANION GAP SERPL CALCULATED.3IONS-SCNC: 11.5 MMOL/L (ref 5–15)
BUN SERPL-MCNC: 15 MG/DL (ref 8–23)
BUN/CREAT SERPL: 17.9 (ref 7–25)
CALCIUM SPEC-SCNC: 9.8 MG/DL (ref 8.6–10.5)
CHLORIDE SERPL-SCNC: 103 MMOL/L (ref 98–107)
CO2 SERPL-SCNC: 23.5 MMOL/L (ref 22–29)
CREAT SERPL-MCNC: 0.84 MG/DL (ref 0.76–1.27)
DEPRECATED RDW RBC AUTO: 43.6 FL (ref 37–54)
EGFRCR SERPLBLD CKD-EPI 2021: 96.2 ML/MIN/1.73
ERYTHROCYTE [DISTWIDTH] IN BLOOD BY AUTOMATED COUNT: 13 % (ref 12.3–15.4)
GLUCOSE SERPL-MCNC: 136 MG/DL (ref 65–99)
HCT VFR BLD AUTO: 41.2 % (ref 37.5–51)
HGB BLD-MCNC: 13.4 G/DL (ref 13–17.7)
MCH RBC QN AUTO: 29.9 PG (ref 26.6–33)
MCHC RBC AUTO-ENTMCNC: 32.5 G/DL (ref 31.5–35.7)
MCV RBC AUTO: 92 FL (ref 79–97)
PLATELET # BLD AUTO: 177 10*3/MM3 (ref 140–450)
PMV BLD AUTO: 11.5 FL (ref 6–12)
POTASSIUM SERPL-SCNC: 4.1 MMOL/L (ref 3.5–5.2)
RBC # BLD AUTO: 4.48 10*6/MM3 (ref 4.14–5.8)
SODIUM SERPL-SCNC: 138 MMOL/L (ref 136–145)
WBC NRBC COR # BLD: 6.11 10*3/MM3 (ref 3.4–10.8)

## 2022-05-23 PROCEDURE — 80048 BASIC METABOLIC PNL TOTAL CA: CPT

## 2022-05-23 PROCEDURE — 93005 ELECTROCARDIOGRAM TRACING: CPT

## 2022-05-23 PROCEDURE — 36415 COLL VENOUS BLD VENIPUNCTURE: CPT

## 2022-05-23 PROCEDURE — 93010 ELECTROCARDIOGRAM REPORT: CPT | Performed by: INTERNAL MEDICINE

## 2022-05-23 PROCEDURE — 85027 COMPLETE CBC AUTOMATED: CPT

## 2022-05-23 NOTE — DISCHARGE INSTRUCTIONS
5/25/22  ARRIVAL TIME PER DR OFFICE    TAKE the following medications the morning of surgery:  All heart or blood pressure medications    HOLD all diabetic medications the morning of surgery as ordered by physician.    Please discontinue all blood thinners and anticoagulants (except aspirin) prior to surgery as per your surgeon and cardiologist instructions.  Aspirin may be continued up to the day prior to surgery.     CHLORHEXIDINE CLOTHS GIVEN WITH INSTRUCTIONS AND FORM TO RETURN TO HOSPITAL, IF APPLICABLE.    General Instructions:  Do not eat or drink after midnight: 5/24/22 includes water, mints, or gum. You may brush your teeth.  Dental appliances that are removable must be taken out day of surgery.  Do not smoke, chew tobacco, or drink alcohol 24 hours prior to surgery.  Bring medications in original bottles, any inhalers and if applicable your C-PAP/BI-PAP machine.  Bring any papers given to you in the doctor's office.  Wear clean comfortable clothes and socks.  Do not wear contact lenses or make-up. Bring a case for your glasses if applicable.  Bring crutches or walker if applicable.  Leave all other valuables and jewelry at home.    If you were given a blood bank ID arm band remember to bring it with you the day of surgery.    Preventing a Surgical Site Infection:  Shower the night before surgery (unless instructed other wise) using a fresh bar of anti-bacterial soap (such as Dial) and clean washcloth. Dry with a clean towel and dress in clean clothing.  For 2 to 3 days before surgery, avoid shaving with a razor near where you will have surgery because the razor can irritate skin and make it easier to develop an infection. Ask your surgeon if you will be receiving antibiotics prior to surgery.  Make sure you, your family, and all healthcare providers clear their hands with soap and water or an alcohol-based hand  before caring for you or your wound.  If at all possible, quit smoking as many days  before surgery as you can.    Day of surgery:  Upon arrival, a Pre-op nurse and Anesthesiologist will review your health history, obtain vital signs, and answer questions you may have. The only belongings needed at this time will be your home medications and if applicable your C-PAP/BI-PAP machine. If you are staying overnight your family can leave the rest of your belongings in the car and bring them to your room later. A Pre-op nurse will start an IV and you may receive medication in preparation for surgery, including something to help you relax. Your family will be able to see you in the Pre-op area. While you are in surgery your family should notify the waiting room  if they leave the waiting room area and provide a contact phone number.    Please be aware that surgery does come with discomfort. We want to make every effort to control your discomfort so please discuss any uncontrolled symptoms with your nurse. Your doctor will most likely have prescribed pain medications.    If you are going home after surgery you will receive individualized written care instructions before being discharged. A responsible adult must drive you to and from the hospital on the day of surgery and stay with you for 24 hours.    If you are staying overnight following surgery, you will be transported to your hospital room following the recovery period.  [facility] has all private rooms.    If you have any questions please call Pre-Admission Testing at [phone].  Deductibles and co-payments are collected on the day of service. Please be prepared to pay the required co-pay, deductible or deposit on the day of service as defined by your plan.    A RESPONSIBLE PERSON MUST REMAIN IN THE WAITING ROOM DURING YOUR PROCEDURE AND A RESPONSIBLE  MUST BE AVAILABLE UPON YOUR DISCHARGE.

## 2022-05-24 ENCOUNTER — LAB (OUTPATIENT)
Dept: LAB | Facility: HOSPITAL | Age: 67
End: 2022-05-24

## 2022-05-24 DIAGNOSIS — N21.0 BLADDER CALCULUS: ICD-10-CM

## 2022-05-24 LAB — SARS-COV-2 RNA RESP QL NAA+PROBE: NOT DETECTED

## 2022-05-24 PROCEDURE — U0003 INFECTIOUS AGENT DETECTION BY NUCLEIC ACID (DNA OR RNA); SEVERE ACUTE RESPIRATORY SYNDROME CORONAVIRUS 2 (SARS-COV-2) (CORONAVIRUS DISEASE [COVID-19]), AMPLIFIED PROBE TECHNIQUE, MAKING USE OF HIGH THROUGHPUT TECHNOLOGIES AS DESCRIBED BY CMS-2020-01-R: HCPCS | Performed by: UROLOGY

## 2022-05-24 PROCEDURE — C9803 HOPD COVID-19 SPEC COLLECT: HCPCS

## 2022-05-25 ENCOUNTER — APPOINTMENT (OUTPATIENT)
Dept: GENERAL RADIOLOGY | Facility: HOSPITAL | Age: 67
End: 2022-05-25

## 2022-05-25 ENCOUNTER — ANESTHESIA EVENT (OUTPATIENT)
Dept: PERIOP | Facility: HOSPITAL | Age: 67
End: 2022-05-25

## 2022-05-25 ENCOUNTER — HOSPITAL ENCOUNTER (OUTPATIENT)
Facility: HOSPITAL | Age: 67
Discharge: HOME | End: 2022-05-25
Attending: UROLOGY | Admitting: UROLOGY

## 2022-05-25 ENCOUNTER — ANESTHESIA (OUTPATIENT)
Dept: PERIOP | Facility: HOSPITAL | Age: 67
End: 2022-05-25

## 2022-05-25 VITALS
BODY MASS INDEX: 21.54 KG/M2 | HEIGHT: 72 IN | RESPIRATION RATE: 18 BRPM | TEMPERATURE: 97.6 F | HEART RATE: 79 BPM | WEIGHT: 159 LBS | DIASTOLIC BLOOD PRESSURE: 89 MMHG | OXYGEN SATURATION: 97 % | SYSTOLIC BLOOD PRESSURE: 142 MMHG

## 2022-05-25 DIAGNOSIS — N40.1 URINARY RETENTION DUE TO BENIGN PROSTATIC HYPERPLASIA: Primary | ICD-10-CM

## 2022-05-25 DIAGNOSIS — N21.0 BLADDER CALCULUS: ICD-10-CM

## 2022-05-25 DIAGNOSIS — R33.8 URINARY RETENTION DUE TO BENIGN PROSTATIC HYPERPLASIA: Primary | ICD-10-CM

## 2022-05-25 LAB
QT INTERVAL: 428 MS
QTC INTERVAL: 382 MS

## 2022-05-25 PROCEDURE — 25010000002 DROPERIDOL PER 5 MG: Performed by: ANESTHESIOLOGY

## 2022-05-25 PROCEDURE — 25010000002 GENTAMICIN PER 80 MG: Performed by: UROLOGY

## 2022-05-25 PROCEDURE — 25010000002 PROPOFOL 10 MG/ML EMULSION: Performed by: NURSE ANESTHETIST, CERTIFIED REGISTERED

## 2022-05-25 PROCEDURE — 25010000002 HYDROMORPHONE PER 4 MG: Performed by: NURSE ANESTHETIST, CERTIFIED REGISTERED

## 2022-05-25 PROCEDURE — 25010000002 ONDANSETRON PER 1 MG: Performed by: NURSE ANESTHETIST, CERTIFIED REGISTERED

## 2022-05-25 PROCEDURE — 25010000002 FENTANYL CITRATE (PF) 50 MCG/ML SOLUTION: Performed by: NURSE ANESTHETIST, CERTIFIED REGISTERED

## 2022-05-25 PROCEDURE — 25010000002 HYDRALAZINE PER 20 MG: Performed by: ANESTHESIOLOGY

## 2022-05-25 PROCEDURE — 52630 REMOVE PROSTATE REGROWTH: CPT | Performed by: UROLOGY

## 2022-05-25 PROCEDURE — 82365 CALCULUS SPECTROSCOPY: CPT | Performed by: UROLOGY

## 2022-05-25 PROCEDURE — 25010000002 FUROSEMIDE PER 20 MG: Performed by: UROLOGY

## 2022-05-25 PROCEDURE — 63710000001 ONDANSETRON ODT 4 MG TABLET DISPERSIBLE: Performed by: ANESTHESIOLOGY

## 2022-05-25 PROCEDURE — 52317 REMOVE BLADDER STONE: CPT | Performed by: UROLOGY

## 2022-05-25 RX ORDER — OXYCODONE HYDROCHLORIDE AND ACETAMINOPHEN 5; 325 MG/1; MG/1
1 TABLET ORAL ONCE AS NEEDED
Status: COMPLETED | OUTPATIENT
Start: 2022-05-25 | End: 2022-05-25

## 2022-05-25 RX ORDER — DROPERIDOL 2.5 MG/ML
0.62 INJECTION, SOLUTION INTRAMUSCULAR; INTRAVENOUS ONCE
Status: COMPLETED | OUTPATIENT
Start: 2022-05-25 | End: 2022-05-25

## 2022-05-25 RX ORDER — FENTANYL CITRATE 50 UG/ML
INJECTION, SOLUTION INTRAMUSCULAR; INTRAVENOUS AS NEEDED
Status: DISCONTINUED | OUTPATIENT
Start: 2022-05-25 | End: 2022-05-25 | Stop reason: SURG

## 2022-05-25 RX ORDER — FUROSEMIDE 10 MG/ML
10 INJECTION INTRAMUSCULAR; INTRAVENOUS ONCE
Status: COMPLETED | OUTPATIENT
Start: 2022-05-25 | End: 2022-05-25

## 2022-05-25 RX ORDER — MAGNESIUM HYDROXIDE 1200 MG/15ML
LIQUID ORAL AS NEEDED
Status: DISCONTINUED | OUTPATIENT
Start: 2022-05-25 | End: 2022-05-25 | Stop reason: HOSPADM

## 2022-05-25 RX ORDER — HYDROCODONE BITARTRATE AND ACETAMINOPHEN 10; 325 MG/1; MG/1
1 TABLET ORAL EVERY 4 HOURS PRN
Qty: 6 TABLET | Refills: 0 | Status: SHIPPED | OUTPATIENT
Start: 2022-05-25 | End: 2022-12-01

## 2022-05-25 RX ORDER — SODIUM CHLORIDE, SODIUM LACTATE, POTASSIUM CHLORIDE, CALCIUM CHLORIDE 600; 310; 30; 20 MG/100ML; MG/100ML; MG/100ML; MG/100ML
INJECTION, SOLUTION INTRAVENOUS CONTINUOUS PRN
Status: DISCONTINUED | OUTPATIENT
Start: 2022-05-25 | End: 2022-05-25 | Stop reason: SURG

## 2022-05-25 RX ORDER — HYDROMORPHONE HCL 110MG/55ML
PATIENT CONTROLLED ANALGESIA SYRINGE INTRAVENOUS AS NEEDED
Status: DISCONTINUED | OUTPATIENT
Start: 2022-05-25 | End: 2022-05-25 | Stop reason: SURG

## 2022-05-25 RX ORDER — GENTAMICIN SULFATE 80 MG/100ML
80 INJECTION, SOLUTION INTRAVENOUS ONCE
Status: COMPLETED | OUTPATIENT
Start: 2022-05-25 | End: 2022-05-25

## 2022-05-25 RX ORDER — SODIUM CHLORIDE, SODIUM LACTATE, POTASSIUM CHLORIDE, CALCIUM CHLORIDE 600; 310; 30; 20 MG/100ML; MG/100ML; MG/100ML; MG/100ML
100 INJECTION, SOLUTION INTRAVENOUS ONCE AS NEEDED
Status: DISCONTINUED | OUTPATIENT
Start: 2022-05-25 | End: 2022-05-25 | Stop reason: HOSPADM

## 2022-05-25 RX ORDER — SCOLOPAMINE TRANSDERMAL SYSTEM 1 MG/1
1 PATCH, EXTENDED RELEASE TRANSDERMAL
Status: DISCONTINUED | OUTPATIENT
Start: 2022-05-25 | End: 2022-05-25 | Stop reason: HOSPADM

## 2022-05-25 RX ORDER — SODIUM CHLORIDE, SODIUM LACTATE, POTASSIUM CHLORIDE, CALCIUM CHLORIDE 600; 310; 30; 20 MG/100ML; MG/100ML; MG/100ML; MG/100ML
125 INJECTION, SOLUTION INTRAVENOUS ONCE
Status: COMPLETED | OUTPATIENT
Start: 2022-05-25 | End: 2022-05-25

## 2022-05-25 RX ORDER — GLYCOPYRROLATE 0.2 MG/ML
INJECTION INTRAMUSCULAR; INTRAVENOUS AS NEEDED
Status: DISCONTINUED | OUTPATIENT
Start: 2022-05-25 | End: 2022-05-25 | Stop reason: SURG

## 2022-05-25 RX ORDER — FUROSEMIDE 10 MG/ML
20 INJECTION INTRAMUSCULAR; INTRAVENOUS ONCE
Status: DISCONTINUED | OUTPATIENT
Start: 2022-05-25 | End: 2022-05-25

## 2022-05-25 RX ORDER — MIDAZOLAM HYDROCHLORIDE 1 MG/ML
0.5 INJECTION INTRAMUSCULAR; INTRAVENOUS
Status: DISCONTINUED | OUTPATIENT
Start: 2022-05-25 | End: 2022-05-25 | Stop reason: HOSPADM

## 2022-05-25 RX ORDER — ONDANSETRON 4 MG/1
4 TABLET, ORALLY DISINTEGRATING ORAL EVERY 6 HOURS PRN
Status: DISCONTINUED | OUTPATIENT
Start: 2022-05-25 | End: 2022-05-25 | Stop reason: HOSPADM

## 2022-05-25 RX ORDER — FAMOTIDINE 10 MG/ML
INJECTION, SOLUTION INTRAVENOUS AS NEEDED
Status: DISCONTINUED | OUTPATIENT
Start: 2022-05-25 | End: 2022-05-25 | Stop reason: SURG

## 2022-05-25 RX ORDER — SODIUM CHLORIDE 0.9 % (FLUSH) 0.9 %
10 SYRINGE (ML) INJECTION EVERY 12 HOURS SCHEDULED
Status: DISCONTINUED | OUTPATIENT
Start: 2022-05-25 | End: 2022-05-25 | Stop reason: HOSPADM

## 2022-05-25 RX ORDER — LIDOCAINE HYDROCHLORIDE 20 MG/ML
INJECTION, SOLUTION INFILTRATION; PERINEURAL AS NEEDED
Status: DISCONTINUED | OUTPATIENT
Start: 2022-05-25 | End: 2022-05-25 | Stop reason: SURG

## 2022-05-25 RX ORDER — ONDANSETRON 2 MG/ML
4 INJECTION INTRAMUSCULAR; INTRAVENOUS AS NEEDED
Status: DISCONTINUED | OUTPATIENT
Start: 2022-05-25 | End: 2022-05-25 | Stop reason: HOSPADM

## 2022-05-25 RX ORDER — ATROPA BELLADONNA AND OPIUM 16.2; 3 MG/1; MG/1
SUPPOSITORY RECTAL AS NEEDED
Status: DISCONTINUED | OUTPATIENT
Start: 2022-05-25 | End: 2022-05-25 | Stop reason: HOSPADM

## 2022-05-25 RX ORDER — MEPERIDINE HYDROCHLORIDE 25 MG/ML
12.5 INJECTION INTRAMUSCULAR; INTRAVENOUS; SUBCUTANEOUS
Status: DISCONTINUED | OUTPATIENT
Start: 2022-05-25 | End: 2022-05-25 | Stop reason: HOSPADM

## 2022-05-25 RX ORDER — ONDANSETRON 2 MG/ML
INJECTION INTRAMUSCULAR; INTRAVENOUS AS NEEDED
Status: DISCONTINUED | OUTPATIENT
Start: 2022-05-25 | End: 2022-05-25 | Stop reason: SURG

## 2022-05-25 RX ORDER — FENTANYL CITRATE 50 UG/ML
50 INJECTION, SOLUTION INTRAMUSCULAR; INTRAVENOUS
Status: DISCONTINUED | OUTPATIENT
Start: 2022-05-25 | End: 2022-05-25 | Stop reason: HOSPADM

## 2022-05-25 RX ORDER — IPRATROPIUM BROMIDE AND ALBUTEROL SULFATE 2.5; .5 MG/3ML; MG/3ML
3 SOLUTION RESPIRATORY (INHALATION) ONCE AS NEEDED
Status: DISCONTINUED | OUTPATIENT
Start: 2022-05-25 | End: 2022-05-25 | Stop reason: HOSPADM

## 2022-05-25 RX ORDER — PROPOFOL 10 MG/ML
VIAL (ML) INTRAVENOUS AS NEEDED
Status: DISCONTINUED | OUTPATIENT
Start: 2022-05-25 | End: 2022-05-25 | Stop reason: SURG

## 2022-05-25 RX ORDER — KETOROLAC TROMETHAMINE 30 MG/ML
15 INJECTION, SOLUTION INTRAMUSCULAR; INTRAVENOUS EVERY 6 HOURS PRN
Status: DISCONTINUED | OUTPATIENT
Start: 2022-05-25 | End: 2022-05-25 | Stop reason: HOSPADM

## 2022-05-25 RX ORDER — SODIUM CHLORIDE 0.9 % (FLUSH) 0.9 %
10 SYRINGE (ML) INJECTION AS NEEDED
Status: DISCONTINUED | OUTPATIENT
Start: 2022-05-25 | End: 2022-05-25 | Stop reason: HOSPADM

## 2022-05-25 RX ORDER — HYDRALAZINE HYDROCHLORIDE 20 MG/ML
10 INJECTION INTRAMUSCULAR; INTRAVENOUS ONCE
Status: COMPLETED | OUTPATIENT
Start: 2022-05-25 | End: 2022-05-25

## 2022-05-25 RX ADMIN — FAMOTIDINE 20 MG: 10 INJECTION INTRAVENOUS at 09:57

## 2022-05-25 RX ADMIN — HYDRALAZINE HYDROCHLORIDE 10 MG: 20 INJECTION INTRAMUSCULAR; INTRAVENOUS at 11:03

## 2022-05-25 RX ADMIN — DROPERIDOL 0.62 MG: 2.5 INJECTION, SOLUTION INTRAMUSCULAR; INTRAVENOUS at 12:20

## 2022-05-25 RX ADMIN — GENTAMICIN SULFATE 80 MG: 80 INJECTION, SOLUTION INTRAVENOUS at 09:59

## 2022-05-25 RX ADMIN — SODIUM CHLORIDE, POTASSIUM CHLORIDE, SODIUM LACTATE AND CALCIUM CHLORIDE: 600; 310; 30; 20 INJECTION, SOLUTION INTRAVENOUS at 09:57

## 2022-05-25 RX ADMIN — ONDANSETRON 4 MG: 2 INJECTION INTRAMUSCULAR; INTRAVENOUS at 11:29

## 2022-05-25 RX ADMIN — FUROSEMIDE 10 MG: 10 INJECTION, SOLUTION INTRAMUSCULAR; INTRAVENOUS at 11:29

## 2022-05-25 RX ADMIN — HYDROMORPHONE HYDROCHLORIDE 0.5 MG: 2 INJECTION, SOLUTION INTRAMUSCULAR; INTRAVENOUS; SUBCUTANEOUS at 10:18

## 2022-05-25 RX ADMIN — FENTANYL CITRATE 100 MCG: 50 INJECTION INTRAMUSCULAR; INTRAVENOUS at 09:57

## 2022-05-25 RX ADMIN — GLYCOPYRROLATE 0.2 MCG: 0.2 INJECTION, SOLUTION INTRAMUSCULAR; INTRAVENOUS at 10:02

## 2022-05-25 RX ADMIN — LIDOCAINE HYDROCHLORIDE 60 MG: 20 INJECTION, SOLUTION INFILTRATION; PERINEURAL at 10:00

## 2022-05-25 RX ADMIN — HYDROMORPHONE HYDROCHLORIDE 0.5 MG: 2 INJECTION, SOLUTION INTRAMUSCULAR; INTRAVENOUS; SUBCUTANEOUS at 10:20

## 2022-05-25 RX ADMIN — HYDROMORPHONE HYDROCHLORIDE 0.5 MG: 2 INJECTION, SOLUTION INTRAMUSCULAR; INTRAVENOUS; SUBCUTANEOUS at 10:23

## 2022-05-25 RX ADMIN — ONDANSETRON 4 MG: 4 TABLET, ORALLY DISINTEGRATING ORAL at 14:03

## 2022-05-25 RX ADMIN — DROPERIDOL 0.62 MG: 2.5 INJECTION, SOLUTION INTRAMUSCULAR; INTRAVENOUS at 13:13

## 2022-05-25 RX ADMIN — OXYCODONE HYDROCHLORIDE AND ACETAMINOPHEN 1 TABLET: 5; 325 TABLET ORAL at 11:23

## 2022-05-25 RX ADMIN — PROPOFOL 100 MG: 10 INJECTION, EMULSION INTRAVENOUS at 10:00

## 2022-05-25 RX ADMIN — SCOPALAMINE 1 PATCH: 1 PATCH, EXTENDED RELEASE TRANSDERMAL at 13:12

## 2022-05-25 RX ADMIN — ONDANSETRON 4 MG: 2 INJECTION INTRAMUSCULAR; INTRAVENOUS at 10:17

## 2022-05-25 RX ADMIN — FENTANYL CITRATE 50 MCG: 50 INJECTION INTRAMUSCULAR; INTRAVENOUS at 10:43

## 2022-05-25 RX ADMIN — SODIUM CHLORIDE, POTASSIUM CHLORIDE, SODIUM LACTATE AND CALCIUM CHLORIDE 125 ML/HR: 600; 310; 30; 20 INJECTION, SOLUTION INTRAVENOUS at 09:33

## 2022-05-25 RX ADMIN — HYDROMORPHONE HYDROCHLORIDE 0.5 MG: 2 INJECTION, SOLUTION INTRAMUSCULAR; INTRAVENOUS; SUBCUTANEOUS at 10:29

## 2022-05-25 NOTE — ANESTHESIA PREPROCEDURE EVALUATION
Anesthesia Evaluation     no history of anesthetic complications:  NPO Solid Status: > 8 hours  NPO Liquid Status: > 8 hours           Airway   Mallampati: II  TM distance: >3 FB  Neck ROM: full  no difficulty expected  Dental    (+) poor dentition    Pulmonary    (+) decreased breath sounds,   Cardiovascular     Rhythm: regular  Rate: normal    (+) hypertension,       Neuro/Psych  GI/Hepatic/Renal/Endo    (+)  hiatal hernia, GERD,  liver disease fatty liver disease, thyroid problem     Musculoskeletal     Abdominal    Substance History      OB/GYN          Other                          Anesthesia Plan    ASA 3     general     intravenous induction     Anesthetic plan, all risks, benefits, and alternatives have been provided, discussed and informed consent has been obtained with: patient.  Use of blood products discussed with patient .   Plan discussed with CRNA.

## 2022-05-25 NOTE — ANESTHESIA PROCEDURE NOTES
Airway  Urgency: elective    Date/Time: 5/25/2022 10:00 AM  Airway not difficult    General Information and Staff    Patient location during procedure: OR  Anesthesiologist: Lex Oates MD  CRNA/CAA: Teresa Zavala CRNA    Indications and Patient Condition  Indications for airway management: airway protection    Preoxygenated: yes  Mask difficulty assessment: 0 - not attempted    Final Airway Details  Final airway type: supraglottic airway      Successful airway: classic  Size 4    Number of attempts at approach: 1  Assessment: lips, teeth, and gum same as pre-op    Additional Comments  LMA placed with no trauma noted. Patient tolerated well. Good seal. Secured.

## 2022-05-25 NOTE — ANESTHESIA POSTPROCEDURE EVALUATION
Patient: Jose Maier    Procedure Summary     Date: 05/25/22 Room / Location:  [facility] OR 06 /  [facility]    Anesthesia Start: 0957 Anesthesia Stop: 1033    Procedure: CYSTOLITHOLAPAXY WITH LASER (N/A ) Diagnosis:       Bladder calculus      (Bladder calculus [N21.0])    Surgeons: Deuce Campbell MD Provider: Lex Oates MD    Anesthesia Type: general ASA Status: 3          Anesthesia Type: general    Vitals  Vitals Value Taken Time   /98 05/25/22 1034   Temp 97.4 °F (36.3 °C) 05/25/22 1034   Pulse 78 05/25/22 1034   Resp 10 05/25/22 1034   SpO2 98 % 05/25/22 1034           Post Anesthesia Care and Evaluation    Patient location during evaluation: bedside  Patient participation: complete - patient participated  Level of consciousness: awake and alert  Pain score: 1  Pain management: adequate  Airway patency: patent  Anesthetic complications: No anesthetic complications  PONV Status: none  Cardiovascular status: acceptable  Respiratory status: acceptable  Hydration status: acceptable

## 2022-05-31 ENCOUNTER — OFFICE VISIT (OUTPATIENT)
Dept: UROLOGY | Facility: CLINIC | Age: 67
End: 2022-05-31

## 2022-05-31 VITALS — HEIGHT: 72 IN | BODY MASS INDEX: 21.54 KG/M2 | WEIGHT: 159 LBS

## 2022-05-31 DIAGNOSIS — N21.0 BLADDER CALCULUS: Primary | ICD-10-CM

## 2022-05-31 LAB
CALCIUM OXALATE DIHYDRATE MFR STONE IR: 70 %
COLOR STONE: NORMAL
COM MFR STONE: 10 %
COMPN STONE: NORMAL
HYDROXYAPATITE 24H ENGDIFF UR: 20 %
LABORATORY COMMENT REPORT: NORMAL
LABORATORY COMMENT REPORT: NORMAL
Lab: NORMAL
Lab: NORMAL
PHOTO: NORMAL
SIZE STONE: NORMAL MM
SPEC SOURCE SUBJ: NORMAL
WT STONE: 157 MG

## 2022-05-31 PROCEDURE — 99024 POSTOP FOLLOW-UP VISIT: CPT | Performed by: UROLOGY

## 2022-09-08 ENCOUNTER — OFFICE VISIT (OUTPATIENT)
Dept: UROLOGY | Facility: CLINIC | Age: 67
End: 2022-09-08

## 2022-09-08 VITALS — WEIGHT: 159 LBS | BODY MASS INDEX: 21.54 KG/M2 | HEIGHT: 72 IN

## 2022-09-08 DIAGNOSIS — R35.1 BENIGN PROSTATIC HYPERPLASIA WITH NOCTURIA: Primary | ICD-10-CM

## 2022-09-08 DIAGNOSIS — N40.1 BENIGN PROSTATIC HYPERPLASIA WITH NOCTURIA: Primary | ICD-10-CM

## 2022-09-08 DIAGNOSIS — R33.8 URINARY RETENTION DUE TO BENIGN PROSTATIC HYPERPLASIA: ICD-10-CM

## 2022-09-08 DIAGNOSIS — N40.1 URINARY RETENTION DUE TO BENIGN PROSTATIC HYPERPLASIA: ICD-10-CM

## 2022-09-08 DIAGNOSIS — N21.0 BLADDER CALCULUS: ICD-10-CM

## 2022-09-08 PROCEDURE — 99213 OFFICE O/P EST LOW 20 MIN: CPT | Performed by: UROLOGY

## 2022-09-08 NOTE — PROGRESS NOTES
Chief Complaint:      Chief Complaint   Patient presents with   • bladder calculus       HPI:   67 y.o. male returns today 3 months follow-up his catheter is he drinks a lot of pop he gets up every hour if he does not he gets up twice he is status post a cystolitholopaxy is doing well I will see him back in a year PSA was 0.912 overall he continues to do better and better.    Past Medical History:     Past Medical History:   Diagnosis Date   • Arthritis     lower spine    • Chaudhry esophagus    • GERD (gastroesophageal reflux disease)    • Hiatal hernia    • Hypertension    • Liver disease     cirrhois   • Thyroid disease        Current Meds:     Current Outpatient Medications   Medication Sig Dispense Refill   • amLODIPine-benazepril (LOTREL 5-20) 5-20 MG per capsule Take 1 capsule by mouth Every Night.     • Cholecalciferol (VITAMIN D3) 43464 units capsule Take 1 capsule by mouth Every 7 (Seven) Days.     • doxycycline (VIBRAMYCIN) 100 MG capsule Take 100 mg by mouth 2 (Two) Times a Day.     • HYDROcodone-acetaminophen (NORCO)  MG per tablet Take 1 tablet by mouth Every 4 (Four) Hours As Needed for Moderate Pain. 6 tablet 0   • lansoprazole (PREVACID) 30 MG capsule Take 30 mg by mouth Daily.     • levothyroxine (SYNTHROID, LEVOTHROID) 125 MCG tablet Take 125 mcg by mouth Daily.     • nadolol (CORGARD) 20 MG tablet Take 20 mg by mouth Every Night.     • oxybutynin (DITROPAN) 5 MG tablet Take 1 tablet by mouth Daily. 90 tablet 5     No current facility-administered medications for this visit.        Allergies:      Allergies   Allergen Reactions   • Bactrim [Sulfamethoxazole-Trimethoprim] Rash        Past Surgical History:     Past Surgical History:   Procedure Laterality Date   • COLONOSCOPY     • CYSTOLITHALOPAXY PERCUTANEOUS N/A 5/25/2022    Procedure: CYSTOLITHOLAPAXY WITH LASER;  Surgeon: Deuce Campbell MD;  Location: [facility];  Service: Urology;  Laterality: N/A;  with removal of residual  tissue   • CYSTOSCOPY TRANSURETHRAL RESECTION OF PROSTATE N/A 2019    Procedure: Cystoscopy and transurethral resection of the prostate, removal of bladder stone;  Surgeon: Deuce Campbell MD;  Location: [facility];  Service: Urology   • ENDOSCOPY     • ESOPHAGEAL VARICES LIGATION     • KIDNEY STONE SURGERY     • SEPTOPLASTY     • TRIGGER FINGER RELEASE Right        Social History:     Social History     Socioeconomic History   • Marital status:    Tobacco Use   • Smoking status: Former Smoker     Packs/day: 0.50     Years: 3.00     Pack years: 1.50     Types: Cigarettes     Quit date: 1979     Years since quittin.2   • Smokeless tobacco: Never Used   Vaping Use   • Vaping Use: Never used   Substance and Sexual Activity   • Alcohol use: Yes     Alcohol/week: 1.0 standard drink     Types: 1 Cans of beer per week     Comment: 1/2 beer every now and then former bourbon drinker several shots per day   • Drug use: No   • Sexual activity: Not Currently     Partners: Female       Family History:     Family History   Problem Relation Age of Onset   • Colon cancer Father    • Cancer Father    • Throat cancer Mother    • Cancer Mother    • Cancer Brother    • Heart disease Paternal Grandfather        Review of Systems:     Review of Systems   Constitutional: Negative.    HENT: Negative.    Eyes: Negative.    Respiratory: Negative.    Cardiovascular: Negative.    Gastrointestinal: Negative.    Endocrine: Negative.    Musculoskeletal: Negative.    Allergic/Immunologic: Negative.    Neurological: Negative.    Hematological: Negative.    Psychiatric/Behavioral: Negative.        Physical Exam:     Physical Exam  Vitals and nursing note reviewed.   Constitutional:       Appearance: He is well-developed.   HENT:      Head: Normocephalic and atraumatic.   Eyes:      Conjunctiva/sclera: Conjunctivae normal.      Pupils: Pupils are equal, round, and reactive to light.   Cardiovascular:      Rate and Rhythm:  Normal rate and regular rhythm.      Heart sounds: Normal heart sounds.   Pulmonary:      Effort: Pulmonary effort is normal.      Breath sounds: Normal breath sounds.   Abdominal:      General: Bowel sounds are normal.      Palpations: Abdomen is soft.   Musculoskeletal:         General: Normal range of motion.      Cervical back: Normal range of motion.   Skin:     General: Skin is warm and dry.   Neurological:      Mental Status: He is alert and oriented to person, place, and time.      Deep Tendon Reflexes: Reflexes are normal and symmetric.   Psychiatric:         Behavior: Behavior normal.         Thought Content: Thought content normal.         Judgment: Judgment normal.         I have reviewed the following portions of the patient's history: Allergies, current medications, past family history, past medical history, past social history, past surgical history, problem list, and ROS and confirm it is accurate.    Recent Image (CT and/or KUB):      CT Abdomen and Pelvis: No results found for this or any previous visit.       CT Stone Protocol: Results for orders placed during the hospital encounter of 05/16/22    CT Abdomen Pelvis Stone Protocol    Narrative  EXAM:  CT Abdomen and Pelvis Without Intravenous Contrast    EXAM DATE:  5/16/2022 3:18 PM    CLINICAL HISTORY:  Flank pain, kidney stone suspected    TECHNIQUE:  Axial computed tomography images of the abdomen and pelvis without  intravenous contrast.  Sagittal and coronal reformatted images were  created and reviewed.  This CT exam was performed using one or more of  the following dose reduction techniques:  automated exposure control,  adjustment of the mA and/or kV according to patient size, and/or use of  iterative reconstruction technique.    COMPARISON:  06/17/2019.    FINDINGS:  LUNG BASES:  Unremarkable.  No mass.  No consolidation.    ABDOMEN:  LIVER:  Unremarkable.  GALLBLADDER AND BILE DUCTS:  Unremarkable.  No calcified stones.  No  ductal  dilation.  PANCREAS:  Unremarkable.  No ductal dilation.  SPLEEN:  Unremarkable.  No splenomegaly.  ADRENALS:  Unremarkable.  No mass.  KIDNEYS AND URETERS:  Unremarkable.  No obstructing stones.  No  hydronephrosis.  STOMACH AND BOWEL:  Unremarkable.  No obstruction.  No mucosal  thickening.    PELVIS:  APPENDIX:  No findings to suggest acute appendicitis.  BLADDER:  Unremarkable.  No stones.  REPRODUCTIVE:  Heavy calcifications in the prostate gland.    ABDOMEN and PELVIS:  INTRAPERITONEAL SPACE:  Unremarkable.  No free air.  No significant  fluid collection.  BONES/JOINTS:  No acute fracture.  No dislocation.  SOFT TISSUES:  Unremarkable.  VASCULATURE:  Atherosclerotic plaque in the aorta.  No abdominal  aortic aneurysm.  LYMPH NODES:  Unremarkable.  No enlarged lymph nodes.    Impression  No evidence of stone or obstruction involving the collecting system of  either kidney.    This report was finalized on 5/16/2022 3:57 PM by Dr. Raul Painter II, MD.       KUB: Results for orders placed in visit on 03/04/22    XR abdomen kub    Narrative  EXAMINATION: XR ABDOMEN KUB-    TECHNIQUE: Single KUB    CLINICAL INDICATION:     kidney stone history; N32.81-Overactive  bladder; R31.29-Other microscopic hematuria; N40.1-Benign prostatic  hyperplasia with lower urinary tract symptoms; R35.1-Nocturia;  R10.9-Unspecified abdominal pain    COMPARISON:    None available.    FINDINGS:  There are no calcifications projecting over the kidneys or along the  expected course of the ureters.  Bowel gas pattern is nonspecific and nonobstructive in appearance.  Scattered fecal material seen throughout colon.  The bony structures are unremarkable.  No definite radiographic evidence of soft tissue mass.    Impression  No renal or ureteral stones radiographically evident.    This report was finalized on 3/4/2022 4:53 PM by Dr. Avi Thorpe MD.       Labs (past 3 months):      No visits with results within 3 Month(s) from this visit.    Latest known visit with results is:   Admission on 05/25/2022, Discharged on 05/25/2022   Component Date Value Ref Range Status   • Stone Source 05/25/2022 Comment   Final    Urinary Bladder   • Color 05/25/2022 Brown   Final   • Size 05/25/2022 4x3  mm Final    Multiple pieces received.  Dimensions of the largest piece  reported.   • Stone Weight 05/25/2022 157.0  mg Final   • Composition 05/25/2022 Comment   Final    Percentage (Represents the % composition)   • Ca Oxalate - Monohydrate, Stone 05/25/2022 10  % Final   • Ca Oxalate-Dihydrate, Stone 05/25/2022 70  % Final   • HYDROXYAPATITE 05/25/2022 20  % Final   • Comment 05/25/2022 Comment   Final    Calculus received in liquid. Wet calculi must be dried  before analysis, which delays reporting of results. Leaving  calculi in liquid (such as water, saline, blood, urine) may  lead to changes in composition.   • Photo 05/25/2022 Comment   Final    Photograph will follow under a separate cover   • Comments: 05/25/2022 Comment   Final    Physician questions regarding Calculi Analysis contact  "[employer] at: [phone].   • Please note 05/25/2022 Comment   Final    Calculi report will follow via computer, mail or   delivery.   • Disclaimer: 05/25/2022 Comment   Final    This test was developed and its performance characteristics  determined by "[employer].  It has not been cleared or approved  by the Food and Drug Administration.        Procedure:       Assessment/Plan:   Bladder calculus status post cystolitholopaxy doing well          This document has been electronically signed by GEOVANNY COYNE MD September 8, 2022 07:54 EDT    Dictated Utilizing Dragon Dictation: Part of this note may be an electronic transcription/translation of spoken language to printed text using the Dragon Dictation System.

## 2022-09-12 DIAGNOSIS — R35.1 BENIGN PROSTATIC HYPERPLASIA WITH NOCTURIA: ICD-10-CM

## 2022-09-12 DIAGNOSIS — N40.1 BENIGN PROSTATIC HYPERPLASIA WITH NOCTURIA: ICD-10-CM

## 2022-09-12 DIAGNOSIS — N32.81 DETRUSOR INSTABILITY: ICD-10-CM

## 2022-09-12 DIAGNOSIS — N39.0 RECURRENT UTI: ICD-10-CM

## 2022-09-12 DIAGNOSIS — R31.0 GROSS HEMATURIA: ICD-10-CM

## 2022-09-12 DIAGNOSIS — R35.0 FREQUENCY OF URINATION: ICD-10-CM

## 2022-09-12 RX ORDER — OXYBUTYNIN CHLORIDE 5 MG/1
5 TABLET ORAL DAILY
Qty: 90 TABLET | Refills: 5 | Status: SHIPPED | OUTPATIENT
Start: 2022-09-12

## 2022-12-01 ENCOUNTER — OFFICE VISIT (OUTPATIENT)
Dept: UROLOGY | Facility: CLINIC | Age: 67
End: 2022-12-01

## 2022-12-01 ENCOUNTER — HOSPITAL ENCOUNTER (OUTPATIENT)
Dept: GENERAL RADIOLOGY | Facility: HOSPITAL | Age: 67
Discharge: HOME | End: 2022-12-01

## 2022-12-01 VITALS — WEIGHT: 159 LBS | BODY MASS INDEX: 21.54 KG/M2 | HEIGHT: 72 IN

## 2022-12-01 DIAGNOSIS — R30.0 DYSURIA: Primary | ICD-10-CM

## 2022-12-01 DIAGNOSIS — N21.0 BLADDER CALCULUS: ICD-10-CM

## 2022-12-01 LAB
BILIRUB BLD-MCNC: NEGATIVE MG/DL
CLARITY, POC: CLEAR
COLOR UR: YELLOW
EXPIRATION DATE: ABNORMAL
GLUCOSE UR STRIP-MCNC: NEGATIVE MG/DL
KETONES UR QL: NEGATIVE
LEUKOCYTE EST, POC: NEGATIVE
Lab: ABNORMAL
NITRITE UR-MCNC: NEGATIVE MG/ML
PH UR: 6 [PH] (ref 5–8)
PROT UR STRIP-MCNC: ABNORMAL MG/DL
RBC # UR STRIP: ABNORMAL /UL
SP GR UR: 1.02 (ref 1–1.03)
UROBILINOGEN UR QL: NORMAL

## 2022-12-01 PROCEDURE — 74018 RADEX ABDOMEN 1 VIEW: CPT | Performed by: RADIOLOGY

## 2022-12-01 PROCEDURE — 74018 RADEX ABDOMEN 1 VIEW: CPT

## 2022-12-01 PROCEDURE — 81003 URINALYSIS AUTO W/O SCOPE: CPT

## 2022-12-01 PROCEDURE — 99213 OFFICE O/P EST LOW 20 MIN: CPT

## 2022-12-01 RX ORDER — TAMSULOSIN HYDROCHLORIDE 0.4 MG/1
1 CAPSULE ORAL DAILY
Qty: 90 CAPSULE | Refills: 3 | Status: SHIPPED | OUTPATIENT
Start: 2022-12-01

## 2022-12-01 NOTE — PROGRESS NOTES
"Chief Complaint:    Chief Complaint   Patient presents with   • Issues urinating       Vital Signs:   Ht 182.9 cm (72.01\")   Wt 72.1 kg (159 lb)   BMI 21.56 kg/m²   Body mass index is 21.56 kg/m².      HPI:  Jose Maier is a 67 y.o. male who presents today for follow up    History of Present Illness  Mr. Maier presents to the clinic for difficulty urinating.  He has a past medical history significant for urinary retention due to BPH, bladder calculus, and nephrolithiasis.  Patient was last seen by Dr. Campbell in September postop cystolitholopaxy.  Patient was doing well until the follow-up in 1 year.  He reports that he has had persistent difficulty urinating for the past week.  He states that he is having burning before and after urination.  Symptoms improved with Azo.  He reports having some flank pain, slight gross hematuria, and urgency. UA today shows 1+ blood with no signs of infection.  He states that he continues to drink pop daily.  KUB today shows moderate amount of stool with no significant calcifications noted.      Past Medical History:  Past Medical History:   Diagnosis Date   • Arthritis     lower spine    • Chaudhry esophagus    • GERD (gastroesophageal reflux disease)    • Hiatal hernia    • Hypertension    • Liver disease     cirrhois   • Thyroid disease        Current Meds:  Current Outpatient Medications   Medication Sig Dispense Refill   • amLODIPine-benazepril (LOTREL 5-20) 5-20 MG per capsule Take 1 capsule by mouth Every Night.     • Cholecalciferol (VITAMIN D3) 15158 units capsule Take 1 capsule by mouth Every 7 (Seven) Days.     • doxycycline (VIBRAMYCIN) 100 MG capsule Take 100 mg by mouth 2 (Two) Times a Day.     • HYDROcodone-acetaminophen (NORCO)  MG per tablet Take 1 tablet by mouth Every 4 (Four) Hours As Needed for Moderate Pain. 6 tablet 0   • lansoprazole (PREVACID) 30 MG capsule Take 30 mg by mouth Daily.     • levothyroxine (SYNTHROID, LEVOTHROID) 125 MCG tablet Take 125 " mcg by mouth Daily.     • nadolol (CORGARD) 20 MG tablet Take 20 mg by mouth Every Night.     • oxybutynin (DITROPAN) 5 MG tablet Take 1 tablet by mouth Daily. 90 tablet 5   • tamsulosin (FLOMAX) 0.4 MG capsule 24 hr capsule Take 1 capsule by mouth Daily. 90 capsule 3     No current facility-administered medications for this visit.        Allergies:   Allergies   Allergen Reactions   • Bactrim [Sulfamethoxazole-Trimethoprim] Rash        Past Surgical History:  Past Surgical History:   Procedure Laterality Date   • COLONOSCOPY     • CYSTOLITHALOPAXY PERCUTANEOUS N/A 2022    Procedure: CYSTOLITHOLAPAXY WITH LASER;  Surgeon: Deuce Campbell MD;  Location: [facility];  Service: Urology;  Laterality: N/A;  with removal of residual tissue   • CYSTOSCOPY TRANSURETHRAL RESECTION OF PROSTATE N/A 2019    Procedure: Cystoscopy and transurethral resection of the prostate, removal of bladder stone;  Surgeon: Deuce Campbell MD;  Location: [facility];  Service: Urology   • ENDOSCOPY     • ESOPHAGEAL VARICES LIGATION     • KIDNEY STONE SURGERY     • SEPTOPLASTY     • TRIGGER FINGER RELEASE Right        Social History:  Social History     Socioeconomic History   • Marital status:    Tobacco Use   • Smoking status: Former     Packs/day: 0.50     Years: 3.00     Pack years: 1.50     Types: Cigarettes     Quit date: 1979     Years since quittin.4   • Smokeless tobacco: Never   Vaping Use   • Vaping Use: Never used   Substance and Sexual Activity   • Alcohol use: Yes     Alcohol/week: 1.0 standard drink     Types: 1 Cans of beer per week     Comment: 1/2 beer every now and then former bourbon drinker several shots per day   • Drug use: No   • Sexual activity: Not Currently     Partners: Female       Family History:  Family History   Problem Relation Age of Onset   • Colon cancer Father    • Cancer Father    • Throat cancer Mother    • Cancer Mother    • Cancer Brother    • Heart disease Paternal  Grandfather        Review of Systems:  Review of Systems   Constitutional: Negative for fatigue, fever and unexpected weight change.   Respiratory: Negative for chest tightness and shortness of breath.    Cardiovascular: Negative for chest pain.   Gastrointestinal: Negative for abdominal pain, constipation, diarrhea, nausea and vomiting.   Genitourinary: Positive for dysuria, flank pain and urgency. Negative for difficulty urinating, frequency, hematuria, scrotal swelling and testicular pain.   Skin: Negative for rash.   Psychiatric/Behavioral: Negative for confusion and suicidal ideas.       Physical Exam:  Physical Exam  Constitutional:       General: He is not in acute distress.     Appearance: Normal appearance.   HENT:      Head: Normocephalic and atraumatic.      Nose: Nose normal.      Mouth/Throat:      Mouth: Mucous membranes are moist.   Eyes:      Conjunctiva/sclera: Conjunctivae normal.   Cardiovascular:      Rate and Rhythm: Normal rate and regular rhythm.      Pulses: Normal pulses.      Heart sounds: Normal heart sounds.   Pulmonary:      Effort: Pulmonary effort is normal.      Breath sounds: Normal breath sounds.   Abdominal:      General: Bowel sounds are normal.      Palpations: Abdomen is soft.      Tenderness: There is no right CVA tenderness or left CVA tenderness.   Musculoskeletal:         General: Normal range of motion.      Cervical back: Normal range of motion.   Skin:     General: Skin is warm.   Neurological:      General: No focal deficit present.      Mental Status: He is alert and oriented to person, place, and time.   Psychiatric:         Mood and Affect: Mood normal.         Behavior: Behavior normal.         Thought Content: Thought content normal.         Judgment: Judgment normal.           Recent Image (CT and/or KUB):   CT Abdomen and Pelvis: No results found for this or any previous visit.     CT Stone Protocol: Results for orders placed during the hospital encounter of  05/16/22    CT Abdomen Pelvis Stone Protocol    Narrative  EXAM:  CT Abdomen and Pelvis Without Intravenous Contrast    EXAM DATE:  5/16/2022 3:18 PM    CLINICAL HISTORY:  Flank pain, kidney stone suspected    TECHNIQUE:  Axial computed tomography images of the abdomen and pelvis without  intravenous contrast.  Sagittal and coronal reformatted images were  created and reviewed.  This CT exam was performed using one or more of  the following dose reduction techniques:  automated exposure control,  adjustment of the mA and/or kV according to patient size, and/or use of  iterative reconstruction technique.    COMPARISON:  06/17/2019.    FINDINGS:  LUNG BASES:  Unremarkable.  No mass.  No consolidation.    ABDOMEN:  LIVER:  Unremarkable.  GALLBLADDER AND BILE DUCTS:  Unremarkable.  No calcified stones.  No  ductal dilation.  PANCREAS:  Unremarkable.  No ductal dilation.  SPLEEN:  Unremarkable.  No splenomegaly.  ADRENALS:  Unremarkable.  No mass.  KIDNEYS AND URETERS:  Unremarkable.  No obstructing stones.  No  hydronephrosis.  STOMACH AND BOWEL:  Unremarkable.  No obstruction.  No mucosal  thickening.    PELVIS:  APPENDIX:  No findings to suggest acute appendicitis.  BLADDER:  Unremarkable.  No stones.  REPRODUCTIVE:  Heavy calcifications in the prostate gland.    ABDOMEN and PELVIS:  INTRAPERITONEAL SPACE:  Unremarkable.  No free air.  No significant  fluid collection.  BONES/JOINTS:  No acute fracture.  No dislocation.  SOFT TISSUES:  Unremarkable.  VASCULATURE:  Atherosclerotic plaque in the aorta.  No abdominal  aortic aneurysm.  LYMPH NODES:  Unremarkable.  No enlarged lymph nodes.    Impression  No evidence of stone or obstruction involving the collecting system of  either kidney.    This report was finalized on 5/16/2022 3:57 PM by Dr. Raul Painter II, MD.     KUB: Results for orders placed in visit on 03/04/22    XR abdomen kub    Narrative  EXAMINATION: XR ABDOMEN KUB-    TECHNIQUE: Single KUB    CLINICAL  INDICATION:     kidney stone history; N32.81-Overactive  bladder; R31.29-Other microscopic hematuria; N40.1-Benign prostatic  hyperplasia with lower urinary tract symptoms; R35.1-Nocturia;  R10.9-Unspecified abdominal pain    COMPARISON:    None available.    FINDINGS:  There are no calcifications projecting over the kidneys or along the  expected course of the ureters.  Bowel gas pattern is nonspecific and nonobstructive in appearance.  Scattered fecal material seen throughout colon.  The bony structures are unremarkable.  No definite radiographic evidence of soft tissue mass.    Impression  No renal or ureteral stones radiographically evident.    This report was finalized on 3/4/2022 4:53 PM by Dr. Avi Thorpe MD.       Labs:  Brief Urine Lab Results  (Last result in the past 365 days)      Color   Clarity   Blood   Leuk Est   Nitrite   Protein   CREAT   Urine HCG        12/01/22 1105 Yellow   Clear   1+   Negative   Negative   1+               Office Visit on 12/01/2022   Component Date Value Ref Range Status   • Color 12/01/2022 Yellow  Yellow, Straw, Dark Yellow, Kala Final   • Clarity, UA 12/01/2022 Clear  Clear Final   • Specific Gravity  12/01/2022 1.020  1.005 - 1.030 Final   • pH, Urine 12/01/2022 6.0  5.0 - 8.0 Final   • Leukocytes 12/01/2022 Negative  Negative Final   • Nitrite, UA 12/01/2022 Negative  Negative Final   • Protein, POC 12/01/2022 1+ (A)  Negative mg/dL Final   • Glucose, UA 12/01/2022 Negative  Negative mg/dL Final   • Ketones, UA 12/01/2022 Negative  Negative Final   • Urobilinogen, UA 12/01/2022 Normal  Normal, 0.2 E.U./dL Final   • Bilirubin 12/01/2022 Negative  Negative Final   • Blood, UA 12/01/2022 1+ (A)  Negative Final   • Lot Number 12/01/2022 [id number]   Final   • Expiration Date 12/01/2022 2/8/24   Final        Procedure: None  Procedures     I have reviewed and agree with the above PMH, PSH, FMH, social history, medications, allergies, and labs.     Assessment/Plan:    Problem List Items Addressed This Visit        Genitourinary and Reproductive     Bladder calculus    Relevant Medications    tamsulosin (FLOMAX) 0.4 MG capsule 24 hr capsule    Other Relevant Orders    XR abdomen kub   Other Visit Diagnoses     Dysuria    -  Primary          Health Maintenance:   Health Maintenance Due   Topic Date Due   • COLORECTAL CANCER SCREENING  Never done   • Hepatitis B (1 of 3 - Risk 3-dose series) Never done   • ANNUAL WELLNESS VISIT  Never done   • Pneumococcal Vaccine 65+ (1 - PCV) Never done   • COVID-19 Vaccine (4 - Booster for Pfizer series) 12/16/2021        Smoking Counseling: Patient is a former smoker who quit in 1979.  He is never used smokeless tobacco.    Urine Incontinence: Patient reports that he is not currently experiencing any symptoms of urinary incontinence.    Patient was given instructions and counseling regarding his condition or for health maintenance advice. Please see specific information pulled into the AVS if appropriate.    Patient Education:   Dysuria/bladder calculus -KUB today shows no significant nephrolithiasis.  Advised patient that CT main need to be completed for further evaluation if symptoms persist.  At this time I am recommending a trial of Flomax once daily to help with urinary symptoms.  Advised patient to return to the clinic or go to the nearest ER if he begins to experience fever, chills, nausea, vomiting, gross hematuria that does not stop, altered mental status or pain uncontrolled with pain medication.  I advised patient to call if he wishes to undergo a CT for further evaluation.  Otherwise, I will follow-up with him in 1 month for reevaluation of symptoms.  Patient verbalizes understanding and agrees to plan of care    Visit Diagnoses:    ICD-10-CM ICD-9-CM   1. Dysuria  R30.0 788.1   2. Bladder calculus  N21.0 594.1       Meds Ordered During Visit:  New Medications Ordered This Visit   Medications   • tamsulosin (FLOMAX) 0.4 MG capsule  24 hr capsule     Sig: Take 1 capsule by mouth Daily.     Dispense:  90 capsule     Refill:  3       Follow Up Appointment: 1 month  No follow-ups on file.      This document has been electronically signed by Leif Alfaro PA-C   December 1, 2022 12:09 EST    Part of this note may be an electronic transcription/translation of spoken language to printed text using the Dragon Dictation System.

## 2022-12-13 DIAGNOSIS — N21.0 BLADDER CALCULUS: Primary | ICD-10-CM

## 2022-12-15 ENCOUNTER — HOSPITAL ENCOUNTER (OUTPATIENT)
Dept: CT IMAGING | Facility: HOSPITAL | Age: 67
Discharge: HOME | End: 2022-12-15

## 2022-12-15 DIAGNOSIS — N21.0 BLADDER CALCULUS: ICD-10-CM

## 2022-12-15 PROCEDURE — 74176 CT ABD & PELVIS W/O CONTRAST: CPT

## 2022-12-15 PROCEDURE — 74176 CT ABD & PELVIS W/O CONTRAST: CPT | Performed by: RADIOLOGY

## 2022-12-21 DIAGNOSIS — N21.0 BLADDER CALCULUS: Primary | ICD-10-CM

## 2022-12-21 RX ORDER — GENTAMICIN SULFATE 80 MG/100ML
80 INJECTION, SOLUTION INTRAVENOUS ONCE
Status: CANCELLED | OUTPATIENT
Start: 2022-12-21 | End: 2022-12-21

## 2023-01-05 ENCOUNTER — OFFICE VISIT (OUTPATIENT)
Dept: UROLOGY | Facility: CLINIC | Age: 68
End: 2023-01-05
Payer: MEDICARE

## 2023-01-05 VITALS — BODY MASS INDEX: 21.53 KG/M2 | HEIGHT: 72 IN | WEIGHT: 158.95 LBS

## 2023-01-05 DIAGNOSIS — R30.0 DYSURIA: ICD-10-CM

## 2023-01-05 DIAGNOSIS — N21.0 BLADDER CALCULUS: Primary | ICD-10-CM

## 2023-01-05 LAB
BILIRUB BLD-MCNC: NEGATIVE MG/DL
CLARITY, POC: CLEAR
COLOR UR: YELLOW
EXPIRATION DATE: NORMAL
GLUCOSE UR STRIP-MCNC: NEGATIVE MG/DL
KETONES UR QL: NEGATIVE
LEUKOCYTE EST, POC: NEGATIVE
Lab: NORMAL
NITRITE UR-MCNC: NEGATIVE MG/ML
PH UR: 6.5 [PH] (ref 5–8)
PROT UR STRIP-MCNC: NEGATIVE MG/DL
RBC # UR STRIP: NEGATIVE /UL
SP GR UR: 1.02 (ref 1–1.03)
UROBILINOGEN UR QL: NORMAL

## 2023-01-05 PROCEDURE — 99213 OFFICE O/P EST LOW 20 MIN: CPT

## 2023-01-05 PROCEDURE — 81003 URINALYSIS AUTO W/O SCOPE: CPT

## 2023-01-05 NOTE — PROGRESS NOTES
Chief Complaint:    No chief complaint on file.      Vital Signs:   Ht 182.9 cm (72.01\")   Wt 72.1 kg (158 lb 15.2 oz)   BMI 21.55 kg/m²   Body mass index is 21.55 kg/m².      HPI:  Jose Maier is a 67 y.o. male who presents today for follow up    History of Present Illness  Mr. Maier presents to the clinic for follow-up for bladder calculus. He has had surgery for bladder calculus removal and prostate stone removal in the past by Dr. Campbell in the summer 2022. Patient had a CT scan completed on 12/15/2022 that revealed a large calculus within the right posterior bladder/prostate. States that he has increased the Flomax to twice daily and noticed a significant improvement in urinary symptoms.  He also has stopped drinking pop and cut back on dairy products.  Still complains of some dysuria and low back pain.  UA today is negative for any signs of infection.  Otherwise he is doing fine today.  He is scheduled for cystoscopy with bladder stone removal on 1/25/2023.      Past Medical History:  Past Medical History:   Diagnosis Date   • Arthritis     lower spine    • Chaudhry esophagus    • GERD (gastroesophageal reflux disease)    • Hiatal hernia    • Hypertension    • Liver disease     cirrhois   • Thyroid disease        Current Meds:  Current Outpatient Medications   Medication Sig Dispense Refill   • amLODIPine-benazepril (LOTREL 5-20) 5-20 MG per capsule Take 1 capsule by mouth Every Night.     • Cholecalciferol (VITAMIN D3) 29387 units capsule Take 1 capsule by mouth Every 7 (Seven) Days.     • doxycycline (VIBRAMYCIN) 100 MG capsule Take 100 mg by mouth 2 (Two) Times a Day.     • levothyroxine (SYNTHROID, LEVOTHROID) 125 MCG tablet Take 125 mcg by mouth Daily.     • nadolol (CORGARD) 20 MG tablet Take 20 mg by mouth Every Night.     • oxybutynin (DITROPAN) 5 MG tablet Take 1 tablet by mouth Daily. 90 tablet 5   • tamsulosin (FLOMAX) 0.4 MG capsule 24 hr capsule Take 1 capsule by mouth Daily. 90 capsule 3     No  current facility-administered medications for this visit.        Allergies:   Allergies   Allergen Reactions   • Bactrim [Sulfamethoxazole-Trimethoprim] Rash        Past Surgical History:  Past Surgical History:   Procedure Laterality Date   • COLONOSCOPY     • CYSTOLITHALOPAXY PERCUTANEOUS N/A 2022    Procedure: CYSTOLITHOLAPAXY WITH LASER;  Surgeon: Deuce Campbell MD;  Location: [facility];  Service: Urology;  Laterality: N/A;  with removal of residual tissue   • CYSTOSCOPY TRANSURETHRAL RESECTION OF PROSTATE N/A 2019    Procedure: Cystoscopy and transurethral resection of the prostate, removal of bladder stone;  Surgeon: Deuce Campbell MD;  Location: [facility];  Service: Urology   • ENDOSCOPY     • ESOPHAGEAL VARICES LIGATION     • KIDNEY STONE SURGERY     • SEPTOPLASTY     • TRIGGER FINGER RELEASE Right        Social History:  Social History     Socioeconomic History   • Marital status:    Tobacco Use   • Smoking status: Former     Packs/day: 0.50     Years: 3.00     Pack years: 1.50     Types: Cigarettes     Quit date: 1979     Years since quittin.5   • Smokeless tobacco: Never   Vaping Use   • Vaping Use: Never used   Substance and Sexual Activity   • Alcohol use: Yes     Alcohol/week: 1.0 standard drink     Types: 1 Cans of beer per week     Comment: 1/2 beer every now and then former bourbon drinker several shots per day   • Drug use: No   • Sexual activity: Not Currently     Partners: Female       Family History:  Family History   Problem Relation Age of Onset   • Colon cancer Father    • Cancer Father    • Throat cancer Mother    • Cancer Mother    • Cancer Brother    • Heart disease Paternal Grandfather        Review of Systems:  Review of Systems   Constitutional: Negative for chills, fatigue, fever and unexpected weight change.   HENT: Negative for ear pain and sinus pressure.    Respiratory: Negative for choking, chest tightness, shortness of breath and  stridor.    Cardiovascular: Negative for chest pain and palpitations.   Gastrointestinal: Negative for abdominal pain, anal bleeding, constipation, diarrhea, nausea and vomiting.   Genitourinary: Positive for dysuria. Negative for difficulty urinating, frequency, hematuria, penile pain and urgency.   Musculoskeletal: Positive for back pain.   Skin: Negative for rash.   Psychiatric/Behavioral: Negative for agitation, confusion and suicidal ideas.       Physical Exam:  Physical Exam  Constitutional:       General: He is not in acute distress.     Appearance: Normal appearance.   HENT:      Head: Normocephalic and atraumatic.      Nose: Nose normal.      Mouth/Throat:      Mouth: Mucous membranes are moist.   Eyes:      Conjunctiva/sclera: Conjunctivae normal.   Cardiovascular:      Rate and Rhythm: Normal rate and regular rhythm.      Pulses: Normal pulses.      Heart sounds: Normal heart sounds.   Pulmonary:      Effort: Pulmonary effort is normal.      Breath sounds: Normal breath sounds.   Abdominal:      General: Bowel sounds are normal.      Palpations: Abdomen is soft.      Tenderness: There is no right CVA tenderness or left CVA tenderness.   Musculoskeletal:         General: Normal range of motion.      Cervical back: Normal range of motion.   Skin:     General: Skin is warm.   Neurological:      General: No focal deficit present.      Mental Status: He is alert and oriented to person, place, and time.   Psychiatric:         Mood and Affect: Mood normal.         Behavior: Behavior normal.         Thought Content: Thought content normal.         Judgment: Judgment normal.         IPSS Questionnaire (AUA-7):  IPSS Questionnaire (AUA-7):                  IPSS Questionnaire (AUA-7):  Over the past month…    1)  Incomplete Emptying  How often have you had a sensation of not emptying your bladder?  1 - Less than 1 time in 5   2)  Frequency  How often have you had to urinate less than every two hours? 1 - Less than 1  time in 5   3)  Intermittency  How often have you found you stopped and started again several times when you urinated?  0 - Not at all   4) Urgency  How often have you found it difficult to postpone urination?  1 - Less than 1 time in 5   5) Weak Stream  How often have you had a weak urinary stream?  1 - Less than 1 time in 5   6) Straining  How often have you had to push or strain to begin urination?  0 - Not at all   7) Nocturia  How many times did you typically get up at night to urinate?  2 - 2 times   Total Score:  6   The International Prostate Symptom Score (IPSS) is used to screen, diagnose, track symptoms of benign prostatic hyperplasia (BPH).    0-7 pts (Mild Symptoms)  / 8-19 pts (Moderate) / 20-35 (Severe)    Quality of life due to urinary symptoms:  If you were to spend the rest of your life with your urinary condition the way it is now, how would you feel about that? 2-Mostly Satisfied   Urine Leakage (Incontinence) 0-No Leakage       Recent Image (CT and/or KUB):   CT Abdomen and Pelvis: No results found for this or any previous visit.     CT Stone Protocol: Results for orders placed during the hospital encounter of 12/15/22    CT Abdomen Pelvis Stone Protocol    Narrative  EXAM:  CT Abdomen and Pelvis Without Intravenous Contrast    EXAM DATE:  12/15/2022 2:15 PM    CLINICAL HISTORY:  Flank pain, kidney stone suspected; N21.0-Calculus in bladder    TECHNIQUE:  Axial computed tomography images of the abdomen and pelvis without  intravenous contrast.  Sagittal and coronal reformatted images were  created and reviewed.  This CT exam was performed using one or more of  the following dose reduction techniques:  automated exposure control,  adjustment of the mA and/or kV according to patient size, and/or use of  iterative reconstruction technique.    COMPARISON:  05/16/2022    FINDINGS:  LUNG BASES:  Unremarkable.  No mass.  No consolidation.    ABDOMEN:  LIVER:  Cirrhotic nodular liver  contour.  GALLBLADDER AND BILE DUCTS:  Unremarkable.  No calcified stones.  No  ductal dilation.  PANCREAS:  Unremarkable.  No ductal dilation.  SPLEEN:  Unremarkable.  No splenomegaly.  ADRENALS:  Unremarkable.  No mass.  KIDNEYS AND URETERS:  Unremarkable.  No obstructing stones.  No  hydronephrosis.  STOMACH AND BOWEL:  Unremarkable.  No obstruction.  No mucosal  thickening.    PELVIS:  APPENDIX:  No findings to suggest acute appendicitis.  BLADDER:  A calcification is noted in region of the right posterior  urinary bladder prostate.  No stones.  REPRODUCTIVE:  See above.    ABDOMEN and PELVIS:  INTRAPERITONEAL SPACE:  Unremarkable.  No free air.  No significant  fluid collection.  BONES/JOINTS:  No acute fracture.  No dislocation.  SOFT TISSUES:  Unremarkable.  VASCULATURE:  Atherosclerotic disease.  No abdominal aortic aneurysm.  LYMPH NODES:  Unremarkable.  No enlarged lymph nodes.    Impression  1.  A calcification is noted in region of the right posterior urinary  bladder prostate.  2.  Cirrhotic nodular liver contour.    This report was finalized on 12/16/2022 8:23 AM by Dr. Trevon Duarte MD.     KUB: Results for orders placed in visit on 12/01/22    XR abdomen kub    Narrative  EXAMINATION: XR ABDOMEN KUB-    CLINICAL INDICATION: kidney stones; N21.0-Calculus in bladder      COMPARISON: 03/04/2022    FINDINGS:  One view of the abdomen    Moderate stool burden    No evidence of bowel obstruction    Impression  Moderate stool burden.  No suspicious calcifications are seen    This report was finalized on 12/1/2022 12:58 PM by Dr. Reinaldo Bates MD.       Labs:  Brief Urine Lab Results  (Last result in the past 365 days)      Color   Clarity   Blood   Leuk Est   Nitrite   Protein   CREAT   Urine HCG        01/05/23 1109 Yellow   Clear   Negative   Negative   Negative   Negative               Office Visit on 01/05/2023   Component Date Value Ref Range Status   • Color 01/05/2023 Yellow  Yellow, Straw, Dark  Yellow, Kala Final   • Clarity, UA 01/05/2023 Clear  Clear Final   • Specific Gravity  01/05/2023 1.020  1.005 - 1.030 Final   • pH, Urine 01/05/2023 6.5  5.0 - 8.0 Final   • Leukocytes 01/05/2023 Negative  Negative Final   • Nitrite, UA 01/05/2023 Negative  Negative Final   • Protein, POC 01/05/2023 Negative  Negative mg/dL Final   • Glucose, UA 01/05/2023 Negative  Negative mg/dL Final   • Ketones, UA 01/05/2023 Negative  Negative Final   • Urobilinogen, UA 01/05/2023 Normal  Normal, 0.2 E.U./dL Final   • Bilirubin 01/05/2023 Negative  Negative Final   • Blood, UA 01/05/2023 Negative  Negative Final   • Lot Number 01/05/2023 [id number]   Final   • Expiration Date 01/05/2023 20,824   Final   Office Visit on 12/01/2022   Component Date Value Ref Range Status   • Color 12/01/2022 Yellow  Yellow, Straw, Dark Yellow, Kala Final   • Clarity, UA 12/01/2022 Clear  Clear Final   • Specific Gravity  12/01/2022 1.020  1.005 - 1.030 Final   • pH, Urine 12/01/2022 6.0  5.0 - 8.0 Final   • Leukocytes 12/01/2022 Negative  Negative Final   • Nitrite, UA 12/01/2022 Negative  Negative Final   • Protein, POC 12/01/2022 1+ (A)  Negative mg/dL Final   • Glucose, UA 12/01/2022 Negative  Negative mg/dL Final   • Ketones, UA 12/01/2022 Negative  Negative Final   • Urobilinogen, UA 12/01/2022 Normal  Normal, 0.2 E.U./dL Final   • Bilirubin 12/01/2022 Negative  Negative Final   • Blood, UA 12/01/2022 1+ (A)  Negative Final   • Lot Number 12/01/2022 [id number]   Final   • Expiration Date 12/01/2022 2/8/24   Final        Procedure: None  Procedures     I have reviewed and agree with the above PMH, PSH, FMH, social history, medications, allergies, and labs.     Assessment/Plan:   Problem List Items Addressed This Visit        Genitourinary and Reproductive     Bladder calculus - Primary    Relevant Orders    POC Urinalysis Dipstick, Automated (Completed)   Other Visit Diagnoses     Dysuria        Relevant Orders    POC  Urinalysis Dipstick, Automated (Completed)          Health Maintenance:   Health Maintenance Due   Topic Date Due   • COLORECTAL CANCER SCREENING  Never done   • Hepatitis B (1 of 3 - Risk 3-dose series) Never done   • ANNUAL WELLNESS VISIT  Never done   • Pneumococcal Vaccine 65+ (1 - PCV) Never done   • COVID-19 Vaccine (4 - Booster for Pfizer series) 12/16/2021   • ZOSTER VACCINE (2 of 2) 12/22/2022        Smoking Counseling: Patient is a former smoker.  Never used smokeless tobacco.    Urine Incontinence: Patient reports that he is not currently experiencing any symptoms of urinary incontinence.    Patient was given instructions and counseling regarding his condition or for health maintenance advice. Please see specific information pulled into the AVS if appropriate.    Patient Education:   Bladder calculus -a bladder/prostate calculus was noted on CT scan completed on 12/15/2022.  Patient is currently scheduled for surgery with Dr. Campbell for stone removal on 1/25/2023.  He is doing well with increase in Flomax to twice daily.  Advised patient to continue taking medication twice daily.  Advised him to increase water intake to 2 to 3 L/day.  Advised him to cut back on foods/drinks that contribute to stone formation.  Educated him to return to the clinic or the nearest ER sooner if symptoms worsen.  Otherwise we will see him for surgery on 1/25.  Patient verbalizes understanding and agrees to plan of care.    Visit Diagnoses:    ICD-10-CM ICD-9-CM   1. Bladder calculus  N21.0 594.1   2. Dysuria  R30.0 788.1       Meds Ordered During Visit:  No orders of the defined types were placed in this encounter.      Follow Up Appointment: Cystoscopy with bladder stone extraction  No follow-ups on file.      This document has been electronically signed by Leif Alfaro PA-C   January 5, 2023 12:17 EST    Part of this note may be an electronic transcription/translation of spoken language to printed text using the Dragon  Dictation System.

## 2023-01-05 NOTE — H&P (VIEW-ONLY)
"Chief Complaint:    No chief complaint on file.      Vital Signs:   Ht 182.9 cm (72.01\")   Wt 72.1 kg (158 lb 15.2 oz)   BMI 21.55 kg/m²   Body mass index is 21.55 kg/m².      HPI:  Jose Maier is a 67 y.o. male who presents today for follow up    History of Present Illness  Mr. Maier presents to the clinic for follow-up for bladder calculus. He has had surgery for bladder calculus removal and prostate stone removal in the past by Dr. Campbell in the summer 2022. Patient had a CT scan completed on 12/15/2022 that revealed a large calculus within the right posterior bladder/prostate. States that he has increased the Flomax to twice daily and noticed a significant improvement in urinary symptoms.  He also has stopped drinking pop and cut back on dairy products.  Still complains of some dysuria and low back pain.  UA today is negative for any signs of infection.  Otherwise he is doing fine today.  He is scheduled for cystoscopy with bladder stone removal on 1/25/2023.      Past Medical History:  Past Medical History:   Diagnosis Date   • Arthritis     lower spine    • Chaudhry esophagus    • GERD (gastroesophageal reflux disease)    • Hiatal hernia    • Hypertension    • Liver disease     cirrhois   • Thyroid disease        Current Meds:  Current Outpatient Medications   Medication Sig Dispense Refill   • amLODIPine-benazepril (LOTREL 5-20) 5-20 MG per capsule Take 1 capsule by mouth Every Night.     • Cholecalciferol (VITAMIN D3) 44352 units capsule Take 1 capsule by mouth Every 7 (Seven) Days.     • doxycycline (VIBRAMYCIN) 100 MG capsule Take 100 mg by mouth 2 (Two) Times a Day.     • levothyroxine (SYNTHROID, LEVOTHROID) 125 MCG tablet Take 125 mcg by mouth Daily.     • nadolol (CORGARD) 20 MG tablet Take 20 mg by mouth Every Night.     • oxybutynin (DITROPAN) 5 MG tablet Take 1 tablet by mouth Daily. 90 tablet 5   • tamsulosin (FLOMAX) 0.4 MG capsule 24 hr capsule Take 1 capsule by mouth Daily. 90 capsule 3     No " current facility-administered medications for this visit.        Allergies:   Allergies   Allergen Reactions   • Bactrim [Sulfamethoxazole-Trimethoprim] Rash        Past Surgical History:  Past Surgical History:   Procedure Laterality Date   • COLONOSCOPY     • CYSTOLITHALOPAXY PERCUTANEOUS N/A 2022    Procedure: CYSTOLITHOLAPAXY WITH LASER;  Surgeon: Deuce Campbell MD;  Location: [facility];  Service: Urology;  Laterality: N/A;  with removal of residual tissue   • CYSTOSCOPY TRANSURETHRAL RESECTION OF PROSTATE N/A 2019    Procedure: Cystoscopy and transurethral resection of the prostate, removal of bladder stone;  Surgeon: Deuce Campbell MD;  Location: [facility];  Service: Urology   • ENDOSCOPY     • ESOPHAGEAL VARICES LIGATION     • KIDNEY STONE SURGERY     • SEPTOPLASTY     • TRIGGER FINGER RELEASE Right        Social History:  Social History     Socioeconomic History   • Marital status:    Tobacco Use   • Smoking status: Former     Packs/day: 0.50     Years: 3.00     Pack years: 1.50     Types: Cigarettes     Quit date: 1979     Years since quittin.5   • Smokeless tobacco: Never   Vaping Use   • Vaping Use: Never used   Substance and Sexual Activity   • Alcohol use: Yes     Alcohol/week: 1.0 standard drink     Types: 1 Cans of beer per week     Comment: 1/2 beer every now and then former bourbon drinker several shots per day   • Drug use: No   • Sexual activity: Not Currently     Partners: Female       Family History:  Family History   Problem Relation Age of Onset   • Colon cancer Father    • Cancer Father    • Throat cancer Mother    • Cancer Mother    • Cancer Brother    • Heart disease Paternal Grandfather        Review of Systems:  Review of Systems   Constitutional: Negative for chills, fatigue, fever and unexpected weight change.   HENT: Negative for ear pain and sinus pressure.    Respiratory: Negative for choking, chest tightness, shortness of breath and  stridor.    Cardiovascular: Negative for chest pain and palpitations.   Gastrointestinal: Negative for abdominal pain, anal bleeding, constipation, diarrhea, nausea and vomiting.   Genitourinary: Positive for dysuria. Negative for difficulty urinating, frequency, hematuria, penile pain and urgency.   Musculoskeletal: Positive for back pain.   Skin: Negative for rash.   Psychiatric/Behavioral: Negative for agitation, confusion and suicidal ideas.       Physical Exam:  Physical Exam  Constitutional:       General: He is not in acute distress.     Appearance: Normal appearance.   HENT:      Head: Normocephalic and atraumatic.      Nose: Nose normal.      Mouth/Throat:      Mouth: Mucous membranes are moist.   Eyes:      Conjunctiva/sclera: Conjunctivae normal.   Cardiovascular:      Rate and Rhythm: Normal rate and regular rhythm.      Pulses: Normal pulses.      Heart sounds: Normal heart sounds.   Pulmonary:      Effort: Pulmonary effort is normal.      Breath sounds: Normal breath sounds.   Abdominal:      General: Bowel sounds are normal.      Palpations: Abdomen is soft.      Tenderness: There is no right CVA tenderness or left CVA tenderness.   Musculoskeletal:         General: Normal range of motion.      Cervical back: Normal range of motion.   Skin:     General: Skin is warm.   Neurological:      General: No focal deficit present.      Mental Status: He is alert and oriented to person, place, and time.   Psychiatric:         Mood and Affect: Mood normal.         Behavior: Behavior normal.         Thought Content: Thought content normal.         Judgment: Judgment normal.         IPSS Questionnaire (AUA-7):  IPSS Questionnaire (AUA-7):                  IPSS Questionnaire (AUA-7):  Over the past month…    1)  Incomplete Emptying  How often have you had a sensation of not emptying your bladder?  1 - Less than 1 time in 5   2)  Frequency  How often have you had to urinate less than every two hours? 1 - Less than 1  time in 5   3)  Intermittency  How often have you found you stopped and started again several times when you urinated?  0 - Not at all   4) Urgency  How often have you found it difficult to postpone urination?  1 - Less than 1 time in 5   5) Weak Stream  How often have you had a weak urinary stream?  1 - Less than 1 time in 5   6) Straining  How often have you had to push or strain to begin urination?  0 - Not at all   7) Nocturia  How many times did you typically get up at night to urinate?  2 - 2 times   Total Score:  6   The International Prostate Symptom Score (IPSS) is used to screen, diagnose, track symptoms of benign prostatic hyperplasia (BPH).    0-7 pts (Mild Symptoms)  / 8-19 pts (Moderate) / 20-35 (Severe)    Quality of life due to urinary symptoms:  If you were to spend the rest of your life with your urinary condition the way it is now, how would you feel about that? 2-Mostly Satisfied   Urine Leakage (Incontinence) 0-No Leakage       Recent Image (CT and/or KUB):   CT Abdomen and Pelvis: No results found for this or any previous visit.     CT Stone Protocol: Results for orders placed during the hospital encounter of 12/15/22    CT Abdomen Pelvis Stone Protocol    Narrative  EXAM:  CT Abdomen and Pelvis Without Intravenous Contrast    EXAM DATE:  12/15/2022 2:15 PM    CLINICAL HISTORY:  Flank pain, kidney stone suspected; N21.0-Calculus in bladder    TECHNIQUE:  Axial computed tomography images of the abdomen and pelvis without  intravenous contrast.  Sagittal and coronal reformatted images were  created and reviewed.  This CT exam was performed using one or more of  the following dose reduction techniques:  automated exposure control,  adjustment of the mA and/or kV according to patient size, and/or use of  iterative reconstruction technique.    COMPARISON:  05/16/2022    FINDINGS:  LUNG BASES:  Unremarkable.  No mass.  No consolidation.    ABDOMEN:  LIVER:  Cirrhotic nodular liver  contour.  GALLBLADDER AND BILE DUCTS:  Unremarkable.  No calcified stones.  No  ductal dilation.  PANCREAS:  Unremarkable.  No ductal dilation.  SPLEEN:  Unremarkable.  No splenomegaly.  ADRENALS:  Unremarkable.  No mass.  KIDNEYS AND URETERS:  Unremarkable.  No obstructing stones.  No  hydronephrosis.  STOMACH AND BOWEL:  Unremarkable.  No obstruction.  No mucosal  thickening.    PELVIS:  APPENDIX:  No findings to suggest acute appendicitis.  BLADDER:  A calcification is noted in region of the right posterior  urinary bladder prostate.  No stones.  REPRODUCTIVE:  See above.    ABDOMEN and PELVIS:  INTRAPERITONEAL SPACE:  Unremarkable.  No free air.  No significant  fluid collection.  BONES/JOINTS:  No acute fracture.  No dislocation.  SOFT TISSUES:  Unremarkable.  VASCULATURE:  Atherosclerotic disease.  No abdominal aortic aneurysm.  LYMPH NODES:  Unremarkable.  No enlarged lymph nodes.    Impression  1.  A calcification is noted in region of the right posterior urinary  bladder prostate.  2.  Cirrhotic nodular liver contour.    This report was finalized on 12/16/2022 8:23 AM by Dr. Trevon Duarte MD.     KUB: Results for orders placed in visit on 12/01/22    XR abdomen kub    Narrative  EXAMINATION: XR ABDOMEN KUB-    CLINICAL INDICATION: kidney stones; N21.0-Calculus in bladder      COMPARISON: 03/04/2022    FINDINGS:  One view of the abdomen    Moderate stool burden    No evidence of bowel obstruction    Impression  Moderate stool burden.  No suspicious calcifications are seen    This report was finalized on 12/1/2022 12:58 PM by Dr. Reinaldo Bates MD.       Labs:  Brief Urine Lab Results  (Last result in the past 365 days)      Color   Clarity   Blood   Leuk Est   Nitrite   Protein   CREAT   Urine HCG        01/05/23 1109 Yellow   Clear   Negative   Negative   Negative   Negative               Office Visit on 01/05/2023   Component Date Value Ref Range Status   • Color 01/05/2023 Yellow  Yellow, Straw, Dark  Yellow, Kala Final   • Clarity, UA 01/05/2023 Clear  Clear Final   • Specific Gravity  01/05/2023 1.020  1.005 - 1.030 Final   • pH, Urine 01/05/2023 6.5  5.0 - 8.0 Final   • Leukocytes 01/05/2023 Negative  Negative Final   • Nitrite, UA 01/05/2023 Negative  Negative Final   • Protein, POC 01/05/2023 Negative  Negative mg/dL Final   • Glucose, UA 01/05/2023 Negative  Negative mg/dL Final   • Ketones, UA 01/05/2023 Negative  Negative Final   • Urobilinogen, UA 01/05/2023 Normal  Normal, 0.2 E.U./dL Final   • Bilirubin 01/05/2023 Negative  Negative Final   • Blood, UA 01/05/2023 Negative  Negative Final   • Lot Number 01/05/2023 [id number]   Final   • Expiration Date 01/05/2023 20,824   Final   Office Visit on 12/01/2022   Component Date Value Ref Range Status   • Color 12/01/2022 Yellow  Yellow, Straw, Dark Yellow, Kala Final   • Clarity, UA 12/01/2022 Clear  Clear Final   • Specific Gravity  12/01/2022 1.020  1.005 - 1.030 Final   • pH, Urine 12/01/2022 6.0  5.0 - 8.0 Final   • Leukocytes 12/01/2022 Negative  Negative Final   • Nitrite, UA 12/01/2022 Negative  Negative Final   • Protein, POC 12/01/2022 1+ (A)  Negative mg/dL Final   • Glucose, UA 12/01/2022 Negative  Negative mg/dL Final   • Ketones, UA 12/01/2022 Negative  Negative Final   • Urobilinogen, UA 12/01/2022 Normal  Normal, 0.2 E.U./dL Final   • Bilirubin 12/01/2022 Negative  Negative Final   • Blood, UA 12/01/2022 1+ (A)  Negative Final   • Lot Number 12/01/2022 [id number]   Final   • Expiration Date 12/01/2022 2/8/24   Final        Procedure: None  Procedures     I have reviewed and agree with the above PMH, PSH, FMH, social history, medications, allergies, and labs.     Assessment/Plan:   Problem List Items Addressed This Visit        Genitourinary and Reproductive     Bladder calculus - Primary    Relevant Orders    POC Urinalysis Dipstick, Automated (Completed)   Other Visit Diagnoses     Dysuria        Relevant Orders    POC  Urinalysis Dipstick, Automated (Completed)          Health Maintenance:   Health Maintenance Due   Topic Date Due   • COLORECTAL CANCER SCREENING  Never done   • Hepatitis B (1 of 3 - Risk 3-dose series) Never done   • ANNUAL WELLNESS VISIT  Never done   • Pneumococcal Vaccine 65+ (1 - PCV) Never done   • COVID-19 Vaccine (4 - Booster for Pfizer series) 12/16/2021   • ZOSTER VACCINE (2 of 2) 12/22/2022        Smoking Counseling: Patient is a former smoker.  Never used smokeless tobacco.    Urine Incontinence: Patient reports that he is not currently experiencing any symptoms of urinary incontinence.    Patient was given instructions and counseling regarding his condition or for health maintenance advice. Please see specific information pulled into the AVS if appropriate.    Patient Education:   Bladder calculus -a bladder/prostate calculus was noted on CT scan completed on 12/15/2022.  Patient is currently scheduled for surgery with Dr. Campbell for stone removal on 1/25/2023.  He is doing well with increase in Flomax to twice daily.  Advised patient to continue taking medication twice daily.  Advised him to increase water intake to 2 to 3 L/day.  Advised him to cut back on foods/drinks that contribute to stone formation.  Educated him to return to the clinic or the nearest ER sooner if symptoms worsen.  Otherwise we will see him for surgery on 1/25.  Patient verbalizes understanding and agrees to plan of care.    Visit Diagnoses:    ICD-10-CM ICD-9-CM   1. Bladder calculus  N21.0 594.1   2. Dysuria  R30.0 788.1       Meds Ordered During Visit:  No orders of the defined types were placed in this encounter.      Follow Up Appointment: Cystoscopy with bladder stone extraction  No follow-ups on file.      This document has been electronically signed by Leif Alfaro PA-C   January 5, 2023 12:17 EST    Part of this note may be an electronic transcription/translation of spoken language to printed text using the Dragon  Dictation System.

## 2023-01-20 NOTE — DISCHARGE INSTRUCTIONS
1/25/23  ARRIVAL TIME PER DR OFFICE  TAKE the following medications the morning of surgery:  All heart or blood pressure medications    HOLD all diabetic medications the morning of surgery as ordered by physician.    Please discontinue all blood thinners and anticoagulants (except aspirin) prior to surgery as per your surgeon and cardiologist instructions.  Aspirin may be continued up to the day prior to surgery.     CHLORHEXIDINE CLOTHS GIVEN WITH INSTRUCTIONS AND FORM TO RETURN TO HOSPITAL, IF APPLICABLE.    General Instructions:  Do not eat or drink after midnight: includes water, mints, or gum. You may brush your teeth.  Dental appliances that are removable must be taken out day of surgery.  Do not smoke, chew tobacco, or drink alcohol.  Bring medications in original bottles, any inhalers and if applicable your C-PAP/BI-PAP machine.  Bring any papers given to you in the doctor's office.  Wear clean comfortable clothes and socks.  Do not wear contact lenses or make-up. Bring a case for your glasses if applicable.  Bring crutches or walker if applicable.  Leave all other valuables and jewelry at home.    If you were given a blood bank ID arm band remember to bring it with you the day of surgery.    Preventing a Surgical Site Infection:  Shower the night before surgery (unless instructed other wise) using a fresh bar of anti-bacterial soap (such as Dial) and clean washcloth. Dry with a clean towel and dress in clean clothing.  For 2 to 3 days before surgery, avoid shaving with a razor near where you will have surgery because the razor can irritate skin and make it easier to develop an infection. Ask your surgeon if you will be receiving antibiotics prior to surgery.  Make sure you, your family, and all healthcare providers clear their hands with soap and water or an alcohol-based hand  before caring for you or your wound.  If at all possible, quit smoking as many days before surgery as you can.    Day of  surgery:  Upon arrival, a Pre-op nurse and Anesthesiologist will review your health history, obtain vital signs, and answer questions you may have. The only belongings needed at this time will be your home medications and if applicable your C-PAP/BI-PAP machine. If you are staying overnight your family can leave the rest of your belongings in the car and bring them to your room later. A Pre-op nurse will start an IV and you may receive medication in preparation for surgery, including something to help you relax. Your family will be able to see you in the Pre-op area. While you are in surgery your family should notify the waiting room  if they leave the waiting room area and provide a contact phone number.    Please be aware that surgery does come with discomfort. We want to make every effort to control your discomfort so please discuss any uncontrolled symptoms with your nurse. Your doctor will most likely have prescribed pain medications.    If you are going home after surgery you will receive individualized written care instructions before being discharged. A responsible adult must drive you to and from the hospital on the day of surgery and stay with you for 24 hours.    If you are staying overnight following surgery, you will be transported to your hospital room following the recovery period.  [facility] has all private rooms.    If you have any questions please call Pre-Admission Testing at [phone].  Deductibles and co-payments are collected on the day of service. Please be prepared to pay the required co-pay, deductible or deposit on the day of service as defined by your plan.    A RESPONSIBLE PERSON MUST REMAIN IN THE WAITING ROOM DURING YOUR PROCEDURE AND A RESPONSIBLE  MUST BE AVAILABLE UPON YOUR DISCHARGE.

## 2023-01-23 ENCOUNTER — PRE-ADMISSION TESTING (OUTPATIENT)
Dept: PREADMISSION TESTING | Facility: HOSPITAL | Age: 68
End: 2023-01-23
Payer: COMMERCIAL

## 2023-01-23 DIAGNOSIS — N21.0 BLADDER CALCULUS: ICD-10-CM

## 2023-01-23 LAB
ANION GAP SERPL CALCULATED.3IONS-SCNC: 7.5 MMOL/L (ref 5–15)
BASOPHILS # BLD AUTO: 0.05 10*3/MM3 (ref 0–0.2)
BASOPHILS NFR BLD AUTO: 1.1 % (ref 0–1.5)
BUN SERPL-MCNC: 13 MG/DL (ref 8–23)
BUN/CREAT SERPL: 18.3 (ref 7–25)
CALCIUM SPEC-SCNC: 8.8 MG/DL (ref 8.6–10.5)
CHLORIDE SERPL-SCNC: 105 MMOL/L (ref 98–107)
CO2 SERPL-SCNC: 25.5 MMOL/L (ref 22–29)
CREAT SERPL-MCNC: 0.71 MG/DL (ref 0.76–1.27)
DEPRECATED RDW RBC AUTO: 43.3 FL (ref 37–54)
EGFRCR SERPLBLD CKD-EPI 2021: 101.2 ML/MIN/1.73
EOSINOPHIL # BLD AUTO: 0.35 10*3/MM3 (ref 0–0.4)
EOSINOPHIL NFR BLD AUTO: 7.5 % (ref 0.3–6.2)
ERYTHROCYTE [DISTWIDTH] IN BLOOD BY AUTOMATED COUNT: 12.9 % (ref 12.3–15.4)
GLUCOSE SERPL-MCNC: 97 MG/DL (ref 65–99)
HCT VFR BLD AUTO: 39.6 % (ref 37.5–51)
HGB BLD-MCNC: 12.9 G/DL (ref 13–17.7)
IMM GRANULOCYTES # BLD AUTO: 0.01 10*3/MM3 (ref 0–0.05)
IMM GRANULOCYTES NFR BLD AUTO: 0.2 % (ref 0–0.5)
LYMPHOCYTES # BLD AUTO: 1.64 10*3/MM3 (ref 0.7–3.1)
LYMPHOCYTES NFR BLD AUTO: 35.2 % (ref 19.6–45.3)
MCH RBC QN AUTO: 30.1 PG (ref 26.6–33)
MCHC RBC AUTO-ENTMCNC: 32.6 G/DL (ref 31.5–35.7)
MCV RBC AUTO: 92.3 FL (ref 79–97)
MONOCYTES # BLD AUTO: 0.52 10*3/MM3 (ref 0.1–0.9)
MONOCYTES NFR BLD AUTO: 11.2 % (ref 5–12)
NEUTROPHILS NFR BLD AUTO: 2.09 10*3/MM3 (ref 1.7–7)
NEUTROPHILS NFR BLD AUTO: 44.8 % (ref 42.7–76)
NRBC BLD AUTO-RTO: 0 /100 WBC (ref 0–0.2)
PLATELET # BLD AUTO: 160 10*3/MM3 (ref 140–450)
PMV BLD AUTO: 10.8 FL (ref 6–12)
POTASSIUM SERPL-SCNC: 4.2 MMOL/L (ref 3.5–5.2)
RBC # BLD AUTO: 4.29 10*6/MM3 (ref 4.14–5.8)
SODIUM SERPL-SCNC: 138 MMOL/L (ref 136–145)
WBC NRBC COR # BLD: 4.66 10*3/MM3 (ref 3.4–10.8)

## 2023-01-23 PROCEDURE — 80048 BASIC METABOLIC PNL TOTAL CA: CPT

## 2023-01-23 PROCEDURE — 85025 COMPLETE CBC W/AUTO DIFF WBC: CPT

## 2023-01-25 ENCOUNTER — ANESTHESIA (OUTPATIENT)
Dept: PERIOP | Facility: HOSPITAL | Age: 68
End: 2023-01-25
Payer: COMMERCIAL

## 2023-01-25 ENCOUNTER — HOSPITAL ENCOUNTER (OUTPATIENT)
Facility: HOSPITAL | Age: 68
Setting detail: HOSPITAL OUTPATIENT SURGERY
Discharge: HOME | End: 2023-01-25
Attending: UROLOGY | Admitting: UROLOGY
Payer: COMMERCIAL

## 2023-01-25 ENCOUNTER — ANESTHESIA EVENT (OUTPATIENT)
Dept: PERIOP | Facility: HOSPITAL | Age: 68
End: 2023-01-25
Payer: COMMERCIAL

## 2023-01-25 ENCOUNTER — APPOINTMENT (OUTPATIENT)
Dept: GENERAL RADIOLOGY | Facility: HOSPITAL | Age: 68
End: 2023-01-25
Payer: COMMERCIAL

## 2023-01-25 VITALS
TEMPERATURE: 97.4 F | WEIGHT: 165.4 LBS | OXYGEN SATURATION: 99 % | SYSTOLIC BLOOD PRESSURE: 132 MMHG | HEART RATE: 73 BPM | DIASTOLIC BLOOD PRESSURE: 88 MMHG | HEIGHT: 72 IN | RESPIRATION RATE: 18 BRPM | BODY MASS INDEX: 22.4 KG/M2

## 2023-01-25 DIAGNOSIS — N21.0 BLADDER CALCULUS: ICD-10-CM

## 2023-01-25 PROCEDURE — 25010000002 GENTAMICIN PER 80 MG

## 2023-01-25 PROCEDURE — 25010000002 FENTANYL CITRATE (PF) 50 MCG/ML SOLUTION: Performed by: NURSE ANESTHETIST, CERTIFIED REGISTERED

## 2023-01-25 PROCEDURE — 82365 CALCULUS SPECTROSCOPY: CPT | Performed by: UROLOGY

## 2023-01-25 PROCEDURE — 52317 REMOVE BLADDER STONE: CPT | Performed by: UROLOGY

## 2023-01-25 PROCEDURE — 25010000002 ONDANSETRON PER 1 MG: Performed by: NURSE ANESTHETIST, CERTIFIED REGISTERED

## 2023-01-25 PROCEDURE — 25010000002 PROPOFOL 10 MG/ML EMULSION: Performed by: NURSE ANESTHETIST, CERTIFIED REGISTERED

## 2023-01-25 RX ORDER — SODIUM CHLORIDE 9 MG/ML
40 INJECTION, SOLUTION INTRAVENOUS AS NEEDED
Status: DISCONTINUED | OUTPATIENT
Start: 2023-01-25 | End: 2023-01-25 | Stop reason: HOSPADM

## 2023-01-25 RX ORDER — ONDANSETRON 2 MG/ML
4 INJECTION INTRAMUSCULAR; INTRAVENOUS AS NEEDED
Status: DISCONTINUED | OUTPATIENT
Start: 2023-01-25 | End: 2023-01-25 | Stop reason: HOSPADM

## 2023-01-25 RX ORDER — SCOLOPAMINE TRANSDERMAL SYSTEM 1 MG/1
1 PATCH, EXTENDED RELEASE TRANSDERMAL ONCE
Status: DISCONTINUED | OUTPATIENT
Start: 2023-01-25 | End: 2023-01-25 | Stop reason: HOSPADM

## 2023-01-25 RX ORDER — FAMOTIDINE 10 MG/ML
INJECTION, SOLUTION INTRAVENOUS AS NEEDED
Status: DISCONTINUED | OUTPATIENT
Start: 2023-01-25 | End: 2023-01-25 | Stop reason: SURG

## 2023-01-25 RX ORDER — SODIUM CHLORIDE 0.9 % (FLUSH) 0.9 %
10 SYRINGE (ML) INJECTION EVERY 12 HOURS SCHEDULED
Status: DISCONTINUED | OUTPATIENT
Start: 2023-01-25 | End: 2023-01-25 | Stop reason: HOSPADM

## 2023-01-25 RX ORDER — OXYCODONE HYDROCHLORIDE AND ACETAMINOPHEN 5; 325 MG/1; MG/1
1 TABLET ORAL ONCE AS NEEDED
Status: DISCONTINUED | OUTPATIENT
Start: 2023-01-25 | End: 2023-01-25 | Stop reason: HOSPADM

## 2023-01-25 RX ORDER — SODIUM CHLORIDE, SODIUM LACTATE, POTASSIUM CHLORIDE, CALCIUM CHLORIDE 600; 310; 30; 20 MG/100ML; MG/100ML; MG/100ML; MG/100ML
125 INJECTION, SOLUTION INTRAVENOUS ONCE
Status: COMPLETED | OUTPATIENT
Start: 2023-01-25 | End: 2023-01-25

## 2023-01-25 RX ORDER — LIDOCAINE HYDROCHLORIDE 20 MG/ML
JELLY TOPICAL AS NEEDED
Status: DISCONTINUED | OUTPATIENT
Start: 2023-01-25 | End: 2023-01-25 | Stop reason: HOSPADM

## 2023-01-25 RX ORDER — MEPERIDINE HYDROCHLORIDE 50 MG/ML
12.5 INJECTION INTRAMUSCULAR; INTRAVENOUS; SUBCUTANEOUS
Status: DISCONTINUED | OUTPATIENT
Start: 2023-01-25 | End: 2023-01-25 | Stop reason: HOSPADM

## 2023-01-25 RX ORDER — MIDAZOLAM HYDROCHLORIDE 1 MG/ML
0.5 INJECTION INTRAMUSCULAR; INTRAVENOUS
Status: DISCONTINUED | OUTPATIENT
Start: 2023-01-25 | End: 2023-01-25 | Stop reason: HOSPADM

## 2023-01-25 RX ORDER — FENTANYL CITRATE 50 UG/ML
50 INJECTION, SOLUTION INTRAMUSCULAR; INTRAVENOUS
Status: DISCONTINUED | OUTPATIENT
Start: 2023-01-25 | End: 2023-01-25 | Stop reason: HOSPADM

## 2023-01-25 RX ORDER — MAGNESIUM HYDROXIDE 1200 MG/15ML
LIQUID ORAL AS NEEDED
Status: DISCONTINUED | OUTPATIENT
Start: 2023-01-25 | End: 2023-01-25 | Stop reason: HOSPADM

## 2023-01-25 RX ORDER — PROPOFOL 10 MG/ML
VIAL (ML) INTRAVENOUS AS NEEDED
Status: DISCONTINUED | OUTPATIENT
Start: 2023-01-25 | End: 2023-01-25 | Stop reason: SURG

## 2023-01-25 RX ORDER — SODIUM CHLORIDE, SODIUM LACTATE, POTASSIUM CHLORIDE, CALCIUM CHLORIDE 600; 310; 30; 20 MG/100ML; MG/100ML; MG/100ML; MG/100ML
100 INJECTION, SOLUTION INTRAVENOUS ONCE AS NEEDED
Status: DISCONTINUED | OUTPATIENT
Start: 2023-01-25 | End: 2023-01-25 | Stop reason: HOSPADM

## 2023-01-25 RX ORDER — HYDROCODONE BITARTRATE AND ACETAMINOPHEN 10; 325 MG/1; MG/1
1 TABLET ORAL EVERY 4 HOURS PRN
Qty: 12 TABLET | Refills: 0 | Status: SHIPPED | OUTPATIENT
Start: 2023-01-25

## 2023-01-25 RX ORDER — ESOMEPRAZOLE MAGNESIUM 40 MG/1
40 CAPSULE, DELAYED RELEASE ORAL
COMMUNITY

## 2023-01-25 RX ORDER — GLYCOPYRROLATE 0.2 MG/ML
INJECTION INTRAMUSCULAR; INTRAVENOUS AS NEEDED
Status: DISCONTINUED | OUTPATIENT
Start: 2023-01-25 | End: 2023-01-25 | Stop reason: SURG

## 2023-01-25 RX ORDER — LIDOCAINE HYDROCHLORIDE 20 MG/ML
INJECTION, SOLUTION EPIDURAL; INFILTRATION; INTRACAUDAL; PERINEURAL AS NEEDED
Status: DISCONTINUED | OUTPATIENT
Start: 2023-01-25 | End: 2023-01-25 | Stop reason: SURG

## 2023-01-25 RX ORDER — GENTAMICIN SULFATE 80 MG/100ML
80 INJECTION, SOLUTION INTRAVENOUS ONCE
Status: COMPLETED | OUTPATIENT
Start: 2023-01-25 | End: 2023-01-25

## 2023-01-25 RX ORDER — ONDANSETRON 2 MG/ML
INJECTION INTRAMUSCULAR; INTRAVENOUS AS NEEDED
Status: DISCONTINUED | OUTPATIENT
Start: 2023-01-25 | End: 2023-01-25 | Stop reason: SURG

## 2023-01-25 RX ORDER — IPRATROPIUM BROMIDE AND ALBUTEROL SULFATE 2.5; .5 MG/3ML; MG/3ML
3 SOLUTION RESPIRATORY (INHALATION) ONCE AS NEEDED
Status: DISCONTINUED | OUTPATIENT
Start: 2023-01-25 | End: 2023-01-25 | Stop reason: HOSPADM

## 2023-01-25 RX ORDER — FENTANYL CITRATE 50 UG/ML
INJECTION, SOLUTION INTRAMUSCULAR; INTRAVENOUS AS NEEDED
Status: DISCONTINUED | OUTPATIENT
Start: 2023-01-25 | End: 2023-01-25 | Stop reason: SURG

## 2023-01-25 RX ORDER — EPHEDRINE SULFATE 5 MG/ML
INJECTION INTRAVENOUS AS NEEDED
Status: DISCONTINUED | OUTPATIENT
Start: 2023-01-25 | End: 2023-01-25 | Stop reason: SURG

## 2023-01-25 RX ORDER — SODIUM CHLORIDE 0.9 % (FLUSH) 0.9 %
10 SYRINGE (ML) INJECTION AS NEEDED
Status: DISCONTINUED | OUTPATIENT
Start: 2023-01-25 | End: 2023-01-25 | Stop reason: HOSPADM

## 2023-01-25 RX ORDER — SODIUM CHLORIDE, SODIUM LACTATE, POTASSIUM CHLORIDE, CALCIUM CHLORIDE 600; 310; 30; 20 MG/100ML; MG/100ML; MG/100ML; MG/100ML
INJECTION, SOLUTION INTRAVENOUS CONTINUOUS PRN
Status: DISCONTINUED | OUTPATIENT
Start: 2023-01-25 | End: 2023-01-25 | Stop reason: SURG

## 2023-01-25 RX ADMIN — SODIUM CHLORIDE, POTASSIUM CHLORIDE, SODIUM LACTATE AND CALCIUM CHLORIDE 125 ML/HR: 600; 310; 30; 20 INJECTION, SOLUTION INTRAVENOUS at 07:21

## 2023-01-25 RX ADMIN — EPHEDRINE SULFATE 10 MG: 5 INJECTION INTRAVENOUS at 07:39

## 2023-01-25 RX ADMIN — ONDANSETRON 4 MG: 2 INJECTION INTRAMUSCULAR; INTRAVENOUS at 07:40

## 2023-01-25 RX ADMIN — SODIUM CHLORIDE, POTASSIUM CHLORIDE, SODIUM LACTATE AND CALCIUM CHLORIDE: 600; 310; 30; 20 INJECTION, SOLUTION INTRAVENOUS at 07:30

## 2023-01-25 RX ADMIN — SCOPALAMINE 1 PATCH: 1 PATCH, EXTENDED RELEASE TRANSDERMAL at 07:21

## 2023-01-25 RX ADMIN — FAMOTIDINE 20 MG: 10 INJECTION, SOLUTION INTRAVENOUS at 07:30

## 2023-01-25 RX ADMIN — GLYCOPYRROLATE 0.2 MCG: 0.4 INJECTION INTRAMUSCULAR; INTRAVENOUS at 07:35

## 2023-01-25 RX ADMIN — LIDOCAINE HYDROCHLORIDE 60 MG: 20 INJECTION, SOLUTION EPIDURAL; INFILTRATION; INTRACAUDAL; PERINEURAL at 07:34

## 2023-01-25 RX ADMIN — PROPOFOL 150 MG: 10 INJECTION, EMULSION INTRAVENOUS at 07:34

## 2023-01-25 RX ADMIN — EPHEDRINE SULFATE 10 MG: 5 INJECTION INTRAVENOUS at 07:44

## 2023-01-25 RX ADMIN — FENTANYL CITRATE 100 MCG: 50 INJECTION INTRAMUSCULAR; INTRAVENOUS at 07:30

## 2023-01-25 RX ADMIN — GENTAMICIN SULFATE 80 MG: 80 INJECTION, SOLUTION INTRAVENOUS at 07:30

## 2023-01-25 NOTE — ANESTHESIA POSTPROCEDURE EVALUATION
Patient: Jose Maier    Procedure Summary     Date: 01/25/23 Room / Location: [facility] OR 06 /  [facility] OR    Anesthesia Start: 0729 Anesthesia Stop: 0752    Procedure: CYSTOSCOPY LITHOLAPAXY BLADDER STONE EXTRACTION Diagnosis:       Bladder calculus      (Bladder calculus [N21.0])    Surgeons: Deuce Campbell MD Provider: Lex Oates MD    Anesthesia Type: general ASA Status: 3          Anesthesia Type: general    Vitals  Vitals Value Taken Time   /92 01/25/23 0821   Temp 97 °F (36.1 °C) 01/25/23 0752   Pulse 64 01/25/23 0821   Resp 9 01/25/23 0821   SpO2 97 % 01/25/23 0821           Post Anesthesia Care and Evaluation    Patient location during evaluation: PHASE II  Patient participation: complete - patient participated  Level of consciousness: awake and alert  Pain score: 0  Pain management: adequate    Airway patency: patent  Anesthetic complications: No anesthetic complications    Cardiovascular status: acceptable  Respiratory status: acceptable  Hydration status: acceptable

## 2023-01-25 NOTE — ANESTHESIA PREPROCEDURE EVALUATION
Anesthesia Evaluation     no history of anesthetic complications:  NPO Solid Status: > 8 hours  NPO Liquid Status: > 8 hours           Airway   Mallampati: II  TM distance: >3 FB  Neck ROM: full  no difficulty expected  Dental    (+) poor dentition    Pulmonary    (+) a smoker Former, decreased breath sounds,   Cardiovascular     Rhythm: regular  Rate: normal    (+) hypertension,       Neuro/Psych  GI/Hepatic/Renal/Endo    (+)  hiatal hernia, GERD,  liver disease fatty liver disease, renal disease, thyroid problem     Musculoskeletal     Abdominal    Substance History      OB/GYN          Other                          Anesthesia Plan    ASA 3     general     intravenous induction     Anesthetic plan, risks, benefits, and alternatives have been provided, discussed and informed consent has been obtained with: patient.    Use of blood products discussed with patient .   Plan discussed with CRNA.

## 2023-01-25 NOTE — OP NOTE
CYSTOSCOPY LITHOLAPAXY BLADDER STONE EXTRACTION  Procedure Note    Jose Maier  1/25/2023    Pre-op Diagnosis:   Bladder calculus [N21.0]    Post-op Diagnosis:     Post-Op Diagnosis Codes:     * Bladder calculus [N21.0]    Procedure/CPT® Codes:  67-year-old white male with recurrent bladder stone.  He has no obstruction.  Following an informed consent brought the procedure suite prepped and draped in a low dorsolithotomy position normal urethra on cystoscopy normal well resected prostatic fossa stone lying posterior to the trigone I used the vasa micron laser and broke up beautifully and I removed all visible fragments no complications were encountered a Laurent catheter was not left to gravity drainage aggregate volume of stone was about 2 cm.    Procedure(s):  CYSTOSCOPY LITHOLAPAXY BLADDER STONE EXTRACTION    Surgeon(s):  Deuce Campbell MD    Anesthesia: see anesthesia record    Staff:   Circulator: Eunice Olmedo RN  Scrub Person: Yuliana Green LPN; Katelynn Renee    Estimated Blood Loss: none  Urine Voided: * No values recorded between 1/25/2023  7:30 AM and 1/25/2023  7:51 AM *    Specimens:                ID Type Source Tests Collected by Time   1 (Not marked as sent) :  Calculus Urinary Bladder STONE ANALYSIS Deuce Campbell MD 1/25/2023 0743         Drains: None    Findings: Bladder stone likely apatite    Blood: N/A    Complications: None    Grafts and Implants: None    Deuce Campbell MD     Date: 1/25/2023  Time: 08:01 EST

## 2023-01-25 NOTE — ANESTHESIA PROCEDURE NOTES
Airway  Urgency: elective    Date/Time: 1/25/2023 7:34 AM  Airway not difficult    General Information and Staff    Patient location during procedure: OR  Anesthesiologist: Lex Oates MD  CRNA/CAA: Teresa Zavala CRNA    Indications and Patient Condition  Indications for airway management: airway protection    Preoxygenated: yes  Mask difficulty assessment: 0 - not attempted    Final Airway Details  Final airway type: supraglottic airway      Successful airway: classic  Size 4     Number of attempts at approach: 1  Assessment: lips, teeth, and gum same as pre-op    Additional Comments  LMA placed with no trauma noted. Patient tolerated well. Good seal. Secured.

## 2023-01-30 LAB
CALCIUM OXALATE DIHYDRATE MFR STONE IR: 80 %
COLOR STONE: NORMAL
COMPN STONE: NORMAL
HYDROXYAPATITE: 20 %
LABORATORY COMMENT REPORT: NORMAL
LABORATORY COMMENT REPORT: NORMAL
Lab: NORMAL
Lab: NORMAL
PHOTO: NORMAL
SIZE STONE: NORMAL MM
SPEC SOURCE SUBJ: NORMAL
STONE ANALYSIS-IMP: NORMAL
WT STONE: 134 MG

## 2023-02-16 ENCOUNTER — OFFICE VISIT (OUTPATIENT)
Dept: UROLOGY | Facility: CLINIC | Age: 68
End: 2023-02-16
Payer: COMMERCIAL

## 2023-02-16 VITALS — HEIGHT: 72 IN | WEIGHT: 165 LBS | BODY MASS INDEX: 22.35 KG/M2

## 2023-02-16 DIAGNOSIS — N21.0 BLADDER CALCULUS: Primary | ICD-10-CM

## 2023-02-16 PROCEDURE — 99212 OFFICE O/P EST SF 10 MIN: CPT

## 2023-02-16 NOTE — PROGRESS NOTES
"Chief Complaint:    Chief Complaint   Patient presents with   • Surgery follow up       Vital Signs:   Ht 182.9 cm (72.01\")   Wt 74.8 kg (165 lb)   BMI 22.37 kg/m²   Body mass index is 22.37 kg/m².      HPI:  Jose Maier is a 67 y.o. male who presents today for follow up    History of Present Illness  Mr. Maier presents to the clinic for a postop follow-up.  Patient underwent a cystoscopy with bladder stone extraction by Dr. Campbell on 1/25/2023.  Cording to Dr. Campbell's op note stone was easily identified and broke up well using the laser.  Pieces were easily extracted using a basket.  Patient reports he has had no significant problems since surgery.  He reports a significant improvement in urinary symptoms.  Denies any gross hematuria, frequency, urgency, dysuria, difficulty urinating, fever, chills, or other lower urinary tract symptoms.  Last PSA was normal.  I we will follow-up with the patient in 1 year for reevaluation.  Advised him return to the clinic sooner if needed.      Past Medical History:  Past Medical History:   Diagnosis Date   • Arthritis     lower spine    • Chaudhry esophagus    • GERD (gastroesophageal reflux disease)    • Hiatal hernia    • Hypertension    • Liver disease     cirrhois   • PONV (postoperative nausea and vomiting)    • Renal calcification    • Thyroid disease        Current Meds:  Current Outpatient Medications   Medication Sig Dispense Refill   • amLODIPine-benazepril (LOTREL 5-20) 5-20 MG per capsule Take 1 capsule by mouth Every Night.     • Cholecalciferol (VITAMIN D3) 78338 units capsule Take 1 capsule by mouth Every 7 (Seven) Days.     • doxycycline (VIBRAMYCIN) 100 MG capsule Take 100 mg by mouth 2 (Two) Times a Day.     • esomeprazole (nexIUM) 40 MG capsule Take 40 mg by mouth Every Morning Before Breakfast.     • HYDROcodone-acetaminophen (NORCO)  MG per tablet Take 1 tablet by mouth Every 4 (Four) Hours As Needed for Moderate Pain. 12 tablet 0   • levothyroxine " (SYNTHROID, LEVOTHROID) 125 MCG tablet Take 125 mcg by mouth Daily.     • nadolol (CORGARD) 20 MG tablet Take 20 mg by mouth Every Night.     • oxybutynin (DITROPAN) 5 MG tablet Take 1 tablet by mouth Daily. 90 tablet 5   • tamsulosin (FLOMAX) 0.4 MG capsule 24 hr capsule Take 1 capsule by mouth Daily. 90 capsule 3     No current facility-administered medications for this visit.        Allergies:   Allergies   Allergen Reactions   • Bactrim [Sulfamethoxazole-Trimethoprim] Rash        Past Surgical History:  Past Surgical History:   Procedure Laterality Date   • COLONOSCOPY     • CYSTOLITHALOPAXY PERCUTANEOUS N/A 2022    Procedure: CYSTOLITHOLAPAXY WITH LASER;  Surgeon: Deuce Campbell MD;  Location: [facility];  Service: Urology;  Laterality: N/A;  with removal of residual tissue   • CYSTOSCOPY LITHOLAPAXY BLADDER STONE EXTRACTION N/A 2023    Procedure: CYSTOSCOPY LITHOLAPAXY BLADDER STONE EXTRACTION;  Surgeon: Deuce Campbell MD;  Location: [facility];  Service: Urology;  Laterality: N/A;   • CYSTOSCOPY TRANSURETHRAL RESECTION OF PROSTATE N/A 2019    Procedure: Cystoscopy and transurethral resection of the prostate, removal of bladder stone;  Surgeon: Deuce Campbell MD;  Location: [facility];  Service: Urology   • ENDOSCOPY     • ESOPHAGEAL VARICES LIGATION     • KIDNEY STONE SURGERY     • SEPTOPLASTY     • TRIGGER FINGER RELEASE Right        Social History:  Social History     Socioeconomic History   • Marital status:    Tobacco Use   • Smoking status: Former     Packs/day: 0.50     Years: 3.00     Pack years: 1.50     Types: Cigarettes     Quit date: 1979     Years since quittin.7   • Smokeless tobacco: Never   Vaping Use   • Vaping Use: Never used   Substance and Sexual Activity   • Alcohol use: Yes     Alcohol/week: 1.0 standard drink     Types: 1 Cans of beer per week     Comment: 1/2 beer every now and then former bourbon drinker several shots per day    • Drug use: No   • Sexual activity: Not Currently     Partners: Female     Birth control/protection: None       Family History:  Family History   Problem Relation Age of Onset   • Colon cancer Father    • Cancer Father    • Throat cancer Mother    • Cancer Mother    • Cancer Brother    • Heart disease Paternal Grandfather        Review of Systems:  Review of Systems   Constitutional: Negative for chills, fatigue, fever and unexpected weight change.   HENT: Negative for ear pain and sinus pressure.    Respiratory: Negative for choking, chest tightness, shortness of breath and stridor.    Cardiovascular: Negative for chest pain and palpitations.   Gastrointestinal: Negative for abdominal pain, anal bleeding, constipation, diarrhea, nausea and vomiting.   Genitourinary: Negative for difficulty urinating, dysuria, frequency, hematuria, penile pain and urgency.   Musculoskeletal: Negative for back pain.   Skin: Negative for rash.   Psychiatric/Behavioral: Negative for agitation, confusion and suicidal ideas.       Physical Exam:  Physical Exam  Constitutional:       General: He is not in acute distress.     Appearance: Normal appearance.   HENT:      Head: Normocephalic and atraumatic.      Nose: Nose normal.      Mouth/Throat:      Mouth: Mucous membranes are moist.   Eyes:      Conjunctiva/sclera: Conjunctivae normal.   Cardiovascular:      Rate and Rhythm: Normal rate and regular rhythm.      Pulses: Normal pulses.      Heart sounds: Normal heart sounds.   Pulmonary:      Effort: Pulmonary effort is normal.      Breath sounds: Normal breath sounds.   Abdominal:      General: Bowel sounds are normal.      Palpations: Abdomen is soft.      Tenderness: There is no right CVA tenderness or left CVA tenderness.   Musculoskeletal:         General: Normal range of motion.      Cervical back: Normal range of motion.   Skin:     General: Skin is warm.   Neurological:      General: No focal deficit present.      Mental Status:  He is alert and oriented to person, place, and time.   Psychiatric:         Mood and Affect: Mood normal.         Behavior: Behavior normal.         Thought Content: Thought content normal.         Judgment: Judgment normal.       Recent Image (CT and/or KUB):   CT Abdomen and Pelvis: No results found for this or any previous visit.     CT Stone Protocol: Results for orders placed during the hospital encounter of 12/15/22    CT Abdomen Pelvis Stone Protocol    Narrative  EXAM:  CT Abdomen and Pelvis Without Intravenous Contrast    EXAM DATE:  12/15/2022 2:15 PM    CLINICAL HISTORY:  Flank pain, kidney stone suspected; N21.0-Calculus in bladder    TECHNIQUE:  Axial computed tomography images of the abdomen and pelvis without  intravenous contrast.  Sagittal and coronal reformatted images were  created and reviewed.  This CT exam was performed using one or more of  the following dose reduction techniques:  automated exposure control,  adjustment of the mA and/or kV according to patient size, and/or use of  iterative reconstruction technique.    COMPARISON:  05/16/2022    FINDINGS:  LUNG BASES:  Unremarkable.  No mass.  No consolidation.    ABDOMEN:  LIVER:  Cirrhotic nodular liver contour.  GALLBLADDER AND BILE DUCTS:  Unremarkable.  No calcified stones.  No  ductal dilation.  PANCREAS:  Unremarkable.  No ductal dilation.  SPLEEN:  Unremarkable.  No splenomegaly.  ADRENALS:  Unremarkable.  No mass.  KIDNEYS AND URETERS:  Unremarkable.  No obstructing stones.  No  hydronephrosis.  STOMACH AND BOWEL:  Unremarkable.  No obstruction.  No mucosal  thickening.    PELVIS:  APPENDIX:  No findings to suggest acute appendicitis.  BLADDER:  A calcification is noted in region of the right posterior  urinary bladder prostate.  No stones.  REPRODUCTIVE:  See above.    ABDOMEN and PELVIS:  INTRAPERITONEAL SPACE:  Unremarkable.  No free air.  No significant  fluid collection.  BONES/JOINTS:  No acute fracture.  No dislocation.  SOFT  TISSUES:  Unremarkable.  VASCULATURE:  Atherosclerotic disease.  No abdominal aortic aneurysm.  LYMPH NODES:  Unremarkable.  No enlarged lymph nodes.    Impression  1.  A calcification is noted in region of the right posterior urinary  bladder prostate.  2.  Cirrhotic nodular liver contour.    This report was finalized on 12/16/2022 8:23 AM by Dr. Trevon Duarte MD.     KUB: Results for orders placed in visit on 12/01/22    XR abdomen kub    Narrative  EXAMINATION: XR ABDOMEN KUB-    CLINICAL INDICATION: kidney stones; N21.0-Calculus in bladder      COMPARISON: 03/04/2022    FINDINGS:  One view of the abdomen    Moderate stool burden    No evidence of bowel obstruction    Impression  Moderate stool burden.  No suspicious calcifications are seen    This report was finalized on 12/1/2022 12:58 PM by Dr. Reinaldo Bates MD.       Labs:  Brief Urine Lab Results  (Last result in the past 365 days)      Color   Clarity   Blood   Leuk Est   Nitrite   Protein   CREAT   Urine HCG        01/05/23 1109 Yellow   Clear   Negative   Negative   Negative   Negative               Admission on 01/25/2023, Discharged on 01/25/2023   Component Date Value Ref Range Status   • Stone Source 01/25/2023 Comment   Final    Urinary Bladder   • Color 01/25/2023 Tan   Final   • Size 01/25/2023 5x4  mm Final    Multiple pieces received.  Dimensions of the largest piece  reported.   • Stone Weight 01/25/2023 134  mg Final   • Composition 01/25/2023 Comment   Final    Percentage (Represents the % composition)   • Ca Oxalate-Dihydrate, Stone 01/25/2023 80  % Final   • HYDROXYAPATITE 01/25/2023 20  % Final   • Comment 01/25/2023 Comment   Final    Calcium phosphate (hydroxyl form) includes hydroxyapatite,  amorphous calcium phosphate, and whitlockite. Hydroxyapatite  is the most common of the calcium phosphate salts found in  human kidney stones.   • Comment 01/25/2023 Comment   Final    Calculus received wet. Wet calculi must be dried  before  analysis, which delays reporting of results. Leaving calculi  wet (such as water, saline, blood, urine) may lead to  changes in composition.   • Photo 01/25/2023 Comment   Final    Photograph will follow under a separate cover   • Comments: 01/25/2023 Comment   Final    Physician questions regarding Calculi Analysis contact  [facility] at: [phone].   • Please note 01/25/2023 Comment   Final    Calculi report will follow via computer, mail or   delivery.   • Disclaimer: 01/25/2023 Comment   Final    This test was developed and its performance characteristics  [facility] by [facility].  It has not been cleared or approved  by the Food and Drug Administration.   Pre-Admission Testing on 01/23/2023   Component Date Value Ref Range Status   • Glucose 01/23/2023 97  65 - 99 mg/dL Final   • BUN 01/23/2023 13  8 - 23 mg/dL Final   • Creatinine 01/23/2023 0.71 (L)  0.76 - 1.27 mg/dL Final   • Sodium 01/23/2023 138  136 - 145 mmol/L Final   • Potassium 01/23/2023 4.2  3.5 - 5.2 mmol/L Final   • Chloride 01/23/2023 105  98 - 107 mmol/L Final   • CO2 01/23/2023 25.5  22.0 - 29.0 mmol/L Final   • Calcium 01/23/2023 8.8  8.6 - 10.5 mg/dL Final   • BUN/Creatinine Ratio 01/23/2023 18.3  7.0 - 25.0 Final   • Anion Gap 01/23/2023 7.5  5.0 - 15.0 mmol/L Final   • eGFR 01/23/2023 101.2  >60.0 mL/min/1.73 Final   • WBC 01/23/2023 4.66  3.40 - 10.80 10*3/mm3 Final   • RBC 01/23/2023 4.29  4.14 - 5.80 10*6/mm3 Final   • Hemoglobin 01/23/2023 12.9 (L)  13.0 - 17.7 g/dL Final   • Hematocrit 01/23/2023 39.6  37.5 - 51.0 % Final   • MCV 01/23/2023 92.3  79.0 - 97.0 fL Final   • MCH 01/23/2023 30.1  26.6 - 33.0 pg Final   • MCHC 01/23/2023 32.6  31.5 - 35.7 g/dL Final   • RDW 01/23/2023 12.9  12.3 - 15.4 % Final   • RDW-SD 01/23/2023 43.3  37.0 - 54.0 fl Final   • MPV 01/23/2023 10.8  6.0 - 12.0 fL Final   • Platelets 01/23/2023 160  140 - 450 10*3/mm3 Final   • Neutrophil % 01/23/2023 44.8  42.7 - 76.0 % Final   • Lymphocyte %  01/23/2023 35.2  19.6 - 45.3 % Final   • Monocyte % 01/23/2023 11.2  5.0 - 12.0 % Final   • Eosinophil % 01/23/2023 7.5 (H)  0.3 - 6.2 % Final   • Basophil % 01/23/2023 1.1  0.0 - 1.5 % Final   • Immature Grans % 01/23/2023 0.2  0.0 - 0.5 % Final   • Neutrophils, Absolute 01/23/2023 2.09  1.70 - 7.00 10*3/mm3 Final   • Lymphocytes, Absolute 01/23/2023 1.64  0.70 - 3.10 10*3/mm3 Final   • Monocytes, Absolute 01/23/2023 0.52  0.10 - 0.90 10*3/mm3 Final   • Eosinophils, Absolute 01/23/2023 0.35  0.00 - 0.40 10*3/mm3 Final   • Basophils, Absolute 01/23/2023 0.05  0.00 - 0.20 10*3/mm3 Final   • Immature Grans, Absolute 01/23/2023 0.01  0.00 - 0.05 10*3/mm3 Final   • nRBC 01/23/2023 0.0  0.0 - 0.2 /100 WBC Final   Office Visit on 01/05/2023   Component Date Value Ref Range Status   • Color 01/05/2023 Yellow  Yellow, Straw, Dark Yellow, Kala Final   • Clarity, UA 01/05/2023 Clear  Clear Final   • Specific Gravity  01/05/2023 1.020  1.005 - 1.030 Final   • pH, Urine 01/05/2023 6.5  5.0 - 8.0 Final   • Leukocytes 01/05/2023 Negative  Negative Final   • Nitrite, UA 01/05/2023 Negative  Negative Final   • Protein, POC 01/05/2023 Negative  Negative mg/dL Final   • Glucose, UA 01/05/2023 Negative  Negative mg/dL Final   • Ketones, UA 01/05/2023 Negative  Negative Final   • Urobilinogen, UA 01/05/2023 Normal  Normal, 0.2 E.U./dL Final   • Bilirubin 01/05/2023 Negative  Negative Final   • Blood, UA 01/05/2023 Negative  Negative Final   • Lot Number 01/05/2023 [id number]   Final   • Expiration Date 01/05/2023 20,824   Final   Office Visit on 12/01/2022   Component Date Value Ref Range Status   • Color 12/01/2022 Yellow  Yellow, Straw, Dark Yellow, Kala Final   • Clarity, UA 12/01/2022 Clear  Clear Final   • Specific Gravity  12/01/2022 1.020  1.005 - 1.030 Final   • pH, Urine 12/01/2022 6.0  5.0 - 8.0 Final   • Leukocytes 12/01/2022 Negative  Negative Final   • Nitrite, UA 12/01/2022 Negative  Negative Final   •  Protein, POC 12/01/2022 1+ (A)  Negative mg/dL Final   • Glucose, UA 12/01/2022 Negative  Negative mg/dL Final   • Ketones, UA 12/01/2022 Negative  Negative Final   • Urobilinogen, UA 12/01/2022 Normal  Normal, 0.2 E.U./dL Final   • Bilirubin 12/01/2022 Negative  Negative Final   • Blood, UA 12/01/2022 1+ (A)  Negative Final   • Lot Number 12/01/2022 [id number]   Final   • Expiration Date 12/01/2022 2/8/24   Final        Procedure: None  Procedures     I have reviewed and agree with the above PMH, PSH, FMH, social history, medications, allergies, and labs.      Assessment/Plan:   Problem List Items Addressed This Visit        Genitourinary and Reproductive     Bladder calculus - Primary       Health Maintenance:   Health Maintenance Due   Topic Date Due   • COLORECTAL CANCER SCREENING  Never done   • Hepatitis B (1 of 3 - Risk 3-dose series) Never done   • ANNUAL WELLNESS VISIT  Never done   • Pneumococcal Vaccine 65+ (1 - PCV) Never done   • COVID-19 Vaccine (4 - Booster for Pfizer series) 12/16/2021   • ZOSTER VACCINE (2 of 2) 12/22/2022        Smoking Counseling: Patient is a former smoker.  Never used smokeless tobacco.    Urine Incontinence: Patient reports that he is not currently experiencing any symptoms of urinary incontinence.    Patient was given instructions and counseling regarding his condition or for health maintenance advice. Please see specific information pulled into the AVS if appropriate.    Patient Education:   Bladder calculus -patient is roughly 3 weeks postop cystoscopy litholapaxy with bladder stone extraction by Dr. Campbell.  Surgery went well and stone was easily removed.  There was no complications and patient was discharged home with no catheter.  He has no significant symptoms since stone extraction.  Advised patient to avoid factors that contribute to stone formation such as soda, tea sweeter unsweet, or items high in calcium/vitamin D.  Advised patient to increase water intake to  2 to 3 L/day.  Advised patient to take Flomax as needed.  Patient's last PSA was normal.  I will follow-up with him in 1 year for repeat PSA.  Advised patient to return to the clinic sooner if needed.    Visit Diagnoses:    ICD-10-CM ICD-9-CM   1. Bladder calculus  N21.0 594.1       Meds Ordered During Visit:  No orders of the defined types were placed in this encounter.      Follow Up Appointment: 1 year  No follow-ups on file.      This document has been electronically signed by Leif Alfaro PA-C   February 16, 2023 16:23 EST    Part of this note may be an electronic transcription/translation of spoken language to printed text using the Dragon Dictation System.

## 2023-11-16 DIAGNOSIS — N40.1 BENIGN PROSTATIC HYPERPLASIA WITH NOCTURIA: ICD-10-CM

## 2023-11-16 DIAGNOSIS — R35.0 FREQUENCY OF URINATION: ICD-10-CM

## 2023-11-16 DIAGNOSIS — N32.81 DETRUSOR INSTABILITY: ICD-10-CM

## 2023-11-16 DIAGNOSIS — R35.1 BENIGN PROSTATIC HYPERPLASIA WITH NOCTURIA: ICD-10-CM

## 2023-11-16 DIAGNOSIS — R31.0 GROSS HEMATURIA: ICD-10-CM

## 2023-11-16 DIAGNOSIS — N39.0 RECURRENT UTI: ICD-10-CM

## 2023-11-16 RX ORDER — OXYBUTYNIN CHLORIDE 5 MG/1
5 TABLET ORAL DAILY
Qty: 90 TABLET | Refills: 5 | Status: SHIPPED | OUTPATIENT
Start: 2023-11-16

## 2024-02-15 ENCOUNTER — OFFICE VISIT (OUTPATIENT)
Dept: UROLOGY | Facility: CLINIC | Age: 69
End: 2024-02-15
Payer: COMMERCIAL

## 2024-02-15 VITALS
BODY MASS INDEX: 22.35 KG/M2 | WEIGHT: 165 LBS | SYSTOLIC BLOOD PRESSURE: 110 MMHG | HEART RATE: 68 BPM | DIASTOLIC BLOOD PRESSURE: 75 MMHG | HEIGHT: 72 IN

## 2024-02-15 DIAGNOSIS — N32.81 DETRUSOR INSTABILITY: ICD-10-CM

## 2024-02-15 DIAGNOSIS — N21.0 BLADDER CALCULUS: Primary | ICD-10-CM

## 2024-02-15 DIAGNOSIS — R35.1 BENIGN PROSTATIC HYPERPLASIA WITH NOCTURIA: ICD-10-CM

## 2024-02-15 DIAGNOSIS — N40.1 BENIGN PROSTATIC HYPERPLASIA WITH NOCTURIA: ICD-10-CM

## 2024-02-15 PROBLEM — R33.8 URINARY RETENTION DUE TO BENIGN PROSTATIC HYPERPLASIA: Status: RESOLVED | Noted: 2019-06-26 | Resolved: 2024-02-15

## 2024-02-15 RX ORDER — AMLODIPINE BESYLATE 5 MG/1
5 TABLET ORAL DAILY
COMMUNITY
Start: 2024-02-08

## 2024-02-16 ENCOUNTER — TELEPHONE (OUTPATIENT)
Dept: UROLOGY | Facility: CLINIC | Age: 69
End: 2024-02-16
Payer: COMMERCIAL

## 2024-02-16 DIAGNOSIS — N21.0 BLADDER CALCULUS: Primary | ICD-10-CM

## 2024-02-22 ENCOUNTER — TELEPHONE (OUTPATIENT)
Dept: UROLOGY | Facility: CLINIC | Age: 69
End: 2024-02-22
Payer: COMMERCIAL

## 2024-02-22 ENCOUNTER — HOSPITAL ENCOUNTER (OUTPATIENT)
Dept: GENERAL RADIOLOGY | Facility: HOSPITAL | Age: 69
Discharge: HOME | End: 2024-02-22
Payer: MEDICARE

## 2024-02-22 DIAGNOSIS — N21.0 BLADDER CALCULUS: ICD-10-CM

## 2024-02-22 PROCEDURE — 74018 RADEX ABDOMEN 1 VIEW: CPT | Performed by: RADIOLOGY

## 2024-02-22 PROCEDURE — 74018 RADEX ABDOMEN 1 VIEW: CPT

## 2024-02-22 NOTE — TELEPHONE ENCOUNTER
Called patient to discuss x-ray results.  Advised and radiologist read report is normal.  Did discuss that there was possible concerns of a bladder versus prostate stone at the base of the pelvis.  Advised him could proceed forward with a CT scan abdomen pelvis stone protocol versus an office cystoscopy.  Patient does not wish to undergo cystoscopy at this time.  States that if symptoms worsen he will call back to schedule a CT scan.  Advised him return to clinic sooner if needed.  Patient verbalized understanding.

## 2024-08-15 ENCOUNTER — HOSPITAL ENCOUNTER (OUTPATIENT)
Dept: GENERAL RADIOLOGY | Facility: HOSPITAL | Age: 69
Discharge: HOME | End: 2024-08-15
Payer: COMMERCIAL

## 2024-08-15 ENCOUNTER — OFFICE VISIT (OUTPATIENT)
Dept: UROLOGY | Facility: CLINIC | Age: 69
End: 2024-08-15
Payer: COMMERCIAL

## 2024-08-15 VITALS
WEIGHT: 154.6 LBS | SYSTOLIC BLOOD PRESSURE: 127 MMHG | HEART RATE: 54 BPM | BODY MASS INDEX: 20.94 KG/M2 | HEIGHT: 72 IN | DIASTOLIC BLOOD PRESSURE: 83 MMHG

## 2024-08-15 DIAGNOSIS — R35.1 BENIGN PROSTATIC HYPERPLASIA WITH NOCTURIA: ICD-10-CM

## 2024-08-15 DIAGNOSIS — N21.0 BLADDER CALCULUS: Primary | ICD-10-CM

## 2024-08-15 DIAGNOSIS — N40.1 BENIGN PROSTATIC HYPERPLASIA WITH NOCTURIA: ICD-10-CM

## 2024-08-15 PROCEDURE — 74018 RADEX ABDOMEN 1 VIEW: CPT

## 2024-08-15 NOTE — PROGRESS NOTES
"Chief Complaint:    Chief Complaint   Patient presents with    Bladder calculus       Vital Signs:   /83   Pulse 54   Ht 182.9 cm (72\")   Wt 70.1 kg (154 lb 9.6 oz)   BMI 20.97 kg/m²   Body mass index is 20.97 kg/m².      HPI:  Jose Maier is a 69 y.o. male who presents today for follow up    History of Present Illness  Mr. Maier presents to the clinic for follow-up for detrusor instability, BPH, and bladder calculus.  Patient was last seen in office 6 months ago and had x-ray completed at that time that was unremarkable.  He denies any significant complications since last office visit.  He states he is urinating well and denies any split stream, hesitancy, weak stream, intermittency, or inability urinate.  He denies any gross hematuria.  He did repeat a KUB prior to office visit that was unremarkable other than stable pelvic calcifications.  No obvious bladder stone was identified.  He has been on oxybutynin in the past however is discontinued medication.  He is not currently taking anything for his lower urinary tract symptoms and has a very minimal IPSS score of 4.  He has no new complaints and is doing well.      Past Medical History:  Past Medical History:   Diagnosis Date    Arthritis     lower spine     Chaudhry esophagus     GERD (gastroesophageal reflux disease)     Hiatal hernia     Hypertension     Liver disease     cirrhois    PONV (postoperative nausea and vomiting)     Renal calcification     Thyroid disease        Current Meds:  Current Outpatient Medications   Medication Sig Dispense Refill    amLODIPine (NORVASC) 5 MG tablet Take 1 tablet by mouth Daily.      amLODIPine-benazepril (LOTREL 5-20) 5-20 MG per capsule Take 1 capsule by mouth Every Night.      Cholecalciferol (VITAMIN D3) 64799 units capsule Take 1 capsule by mouth Every 7 (Seven) Days.      doxycycline (VIBRAMYCIN) 100 MG capsule Take 1 capsule by mouth 2 (Two) Times a Day.      esomeprazole (nexIUM) 40 MG capsule Take 1 capsule " by mouth Every Morning Before Breakfast.      levothyroxine (SYNTHROID, LEVOTHROID) 125 MCG tablet Take 1 tablet by mouth Daily.      nadolol (CORGARD) 20 MG tablet Take 1 tablet by mouth Every Night.       No current facility-administered medications for this visit.        Allergies:   Allergies   Allergen Reactions    Bactrim [Sulfamethoxazole-Trimethoprim] Rash        Past Surgical History:  Past Surgical History:   Procedure Laterality Date    COLONOSCOPY      CYSTOLITHALOPAXY PERCUTANEOUS N/A 2022    Procedure: CYSTOLITHOLAPAXY WITH LASER;  Surgeon: Deuce Campbell MD;  Location: [facility];  Service: Urology;  Laterality: N/A;  with removal of residual tissue    CYSTOSCOPY LITHOLAPAXY BLADDER STONE EXTRACTION N/A 2023    Procedure: CYSTOSCOPY LITHOLAPAXY BLADDER STONE EXTRACTION;  Surgeon: Deuce Campbell MD;  Location: [facility];  Service: Urology;  Laterality: N/A;    CYSTOSCOPY TRANSURETHRAL RESECTION OF PROSTATE N/A 2019    Procedure: Cystoscopy and transurethral resection of the prostate, removal of bladder stone;  Surgeon: Deuce Campbell MD;  Location: [facility];  Service: Urology    ENDOSCOPY      ESOPHAGEAL VARICES LIGATION      KIDNEY STONE SURGERY      SEPTOPLASTY      TRIGGER FINGER RELEASE Right        Social History:  Social History     Socioeconomic History    Marital status:    Tobacco Use    Smoking status: Former     Current packs/day: 0.00     Average packs/day: 0.5 packs/day for 3.0 years (1.5 ttl pk-yrs)     Types: Cigarettes     Start date: 1976     Quit date: 1979     Years since quittin.2    Smokeless tobacco: Never   Vaping Use    Vaping status: Never Used   Substance and Sexual Activity    Alcohol use: Yes     Alcohol/week: 1.0 standard drink of alcohol     Types: 1 Cans of beer per week     Comment: 1/2 beer every now and then former bourbon drinker several shots per day    Drug use: No    Sexual activity: Not Currently      Partners: Female     Birth control/protection: None       Family History:  Family History   Problem Relation Age of Onset    Colon cancer Father     Cancer Father     Throat cancer Mother     Cancer Mother     Cancer Brother     Heart disease Paternal Grandfather        Review of Systems:  Review of Systems   Constitutional:  Negative for chills, fatigue, fever and unexpected weight change.   Eyes:  Negative for visual disturbance.   Respiratory:  Negative for cough, chest tightness, shortness of breath and wheezing.    Cardiovascular:  Negative for chest pain.   Gastrointestinal:  Negative for abdominal pain, constipation, diarrhea, nausea and vomiting.   Genitourinary:  Negative for difficulty urinating, dysuria, frequency and urgency.   Skin:  Negative for rash.   Psychiatric/Behavioral:  Negative for confusion and suicidal ideas.        Physical Exam:  Physical Exam  Constitutional:       General: He is not in acute distress.     Appearance: Normal appearance.   HENT:      Head: Normocephalic and atraumatic.      Nose: Nose normal.      Mouth/Throat:      Mouth: Mucous membranes are moist.   Eyes:      Conjunctiva/sclera: Conjunctivae normal.   Cardiovascular:      Rate and Rhythm: Normal rate and regular rhythm.      Pulses: Normal pulses.      Heart sounds: Normal heart sounds.   Pulmonary:      Effort: Pulmonary effort is normal.      Breath sounds: Normal breath sounds.   Abdominal:      General: Bowel sounds are normal.      Palpations: Abdomen is soft.   Musculoskeletal:         General: Normal range of motion.      Cervical back: Normal range of motion.   Skin:     General: Skin is warm.   Neurological:      General: No focal deficit present.      Mental Status: He is alert and oriented to person, place, and time.   Psychiatric:         Mood and Affect: Mood normal.         Behavior: Behavior normal.         Thought Content: Thought content normal.         Judgment: Judgment normal.         IPSS  Questionnaire (AUA-7):  IPSS Questionnaire (AUA-7):                  IPSS Questionnaire (AUA-7):  Over the past month…    1)  Incomplete Emptying  How often have you had a sensation of not emptying your bladder?  1 - Less than 1 time in 5   2)  Frequency  How often have you had to urinate less than every two hours? 1 - Less than 1 time in 5   3)  Intermittency  How often have you found you stopped and started again several times when you urinated?  0 - Not at all   4) Urgency  How often have you found it difficult to postpone urination?  0 - Not at all   5) Weak Stream  How often have you had a weak urinary stream?  0 - Not at all   6) Straining  How often have you had to push or strain to begin urination?  0 - Not at all   7) Nocturia  How many times did you typically get up at night to urinate?  2 - 2 times   Total Score:  4   The International Prostate Symptom Score (IPSS) is used to screen, diagnose, track symptoms of benign prostatic hyperplasia (BPH).    0-7 pts (Mild Symptoms)  / 8-19 pts (Moderate) / 20-35 (Severe)    Quality of life due to urinary symptoms:  If you were to spend the rest of your life with your urinary condition the way it is now, how would you feel about that? 1-Pleased   Urine Leakage (Incontinence) 0-No Leakage       Recent Image (CT and/or KUB):   CT Abdomen and Pelvis: No results found for this or any previous visit.     CT Stone Protocol: Results for orders placed during the hospital encounter of 12/15/22    CT Abdomen Pelvis Stone Protocol    Narrative  EXAM:  CT Abdomen and Pelvis Without Intravenous Contrast    EXAM DATE:  12/15/2022 2:15 PM    CLINICAL HISTORY:  Flank pain, kidney stone suspected; N21.0-Calculus in bladder    TECHNIQUE:  Axial computed tomography images of the abdomen and pelvis without  intravenous contrast.  Sagittal and coronal reformatted images were  created and reviewed.  This CT exam was performed using one or more of  the following dose reduction  techniques:  automated exposure control,  adjustment of the mA and/or kV according to patient size, and/or use of  iterative reconstruction technique.    COMPARISON:  05/16/2022    FINDINGS:  LUNG BASES:  Unremarkable.  No mass.  No consolidation.    ABDOMEN:  LIVER:  Cirrhotic nodular liver contour.  GALLBLADDER AND BILE DUCTS:  Unremarkable.  No calcified stones.  No  ductal dilation.  PANCREAS:  Unremarkable.  No ductal dilation.  SPLEEN:  Unremarkable.  No splenomegaly.  ADRENALS:  Unremarkable.  No mass.  KIDNEYS AND URETERS:  Unremarkable.  No obstructing stones.  No  hydronephrosis.  STOMACH AND BOWEL:  Unremarkable.  No obstruction.  No mucosal  thickening.    PELVIS:  APPENDIX:  No findings to suggest acute appendicitis.  BLADDER:  A calcification is noted in region of the right posterior  urinary bladder prostate.  No stones.  REPRODUCTIVE:  See above.    ABDOMEN and PELVIS:  INTRAPERITONEAL SPACE:  Unremarkable.  No free air.  No significant  fluid collection.  BONES/JOINTS:  No acute fracture.  No dislocation.  SOFT TISSUES:  Unremarkable.  VASCULATURE:  Atherosclerotic disease.  No abdominal aortic aneurysm.  LYMPH NODES:  Unremarkable.  No enlarged lymph nodes.    Impression  1.  A calcification is noted in region of the right posterior urinary  bladder prostate.  2.  Cirrhotic nodular liver contour.    This report was finalized on 12/16/2022 8:23 AM by Dr. Trevon Duarte MD.     KUB: Results for orders placed during the hospital encounter of 02/22/24    XR Abdomen KUB    Narrative  EXAM:  XR Abdomen, 1 View    EXAM DATE:  2/22/2024 9:25 AM    CLINICAL HISTORY:  flank pain; N21.0-Calculus in bladder    TECHNIQUE:  Frontal supine view of the abdomen/pelvis.    COMPARISON:  No relevant prior studies available.    FINDINGS:  GASTROINTESTINAL TRACT:  Unremarkable as visualized.  No dilation.  BONES/JOINTS:  Unremarkable as visualized.  No acute fracture.  VASCULATURE:  Atherosclerotic  disease.    Impression  No acute findings.      This report was finalized on 2/22/2024 9:56 AM by Dr. Trevon Duarte MD.       Labs:  Brief Urine Lab Results       None          No visits with results within 3 Month(s) from this visit.   Latest known visit with results is:   Admission on 01/25/2023, Discharged on 01/25/2023   Component Date Value Ref Range Status    Stone Source 01/25/2023 Comment   Final    Urinary Bladder    Color 01/25/2023 Tan   Final    Size 01/25/2023 5x4  mm Final    Multiple pieces received.  Dimensions of the largest piece  reported.    Stone Weight 01/25/2023 134  mg Final    Composition 01/25/2023 Comment   Final    Percentage (Represents the % composition)    Ca Oxalate-Dihydrate, Stone 01/25/2023 80  % Final    HYDROXYAPATITE 01/25/2023 20  % Final    Comment 01/25/2023 Comment   Final    Calcium phosphate (hydroxyl form) includes hydroxyapatite,  amorphous calcium phosphate, and whitlockite. Hydroxyapatite  is the most common of the calcium phosphate salts found in  human kidney stones.    Comment 01/25/2023 Comment   Final    Calculus received wet. Wet calculi must be dried before  analysis, which delays reporting of results. Leaving calculi  wet (such as water, saline, blood, urine) may lead to  changes in composition.    Photo 01/25/2023 Comment   Final    Photograph will follow under a separate cover    Comments: 01/25/2023 Comment   Final    Physician questions regarding Calculi Analysis contact  [facility] at: [phone].    Please note 01/25/2023 Comment   Final    Calculi report will follow via computer, mail or   delivery.    Disclaimer: 01/25/2023 Comment   Final    This test was developed and its performance characteristics  determined by [employer].  It has not been cleared or approved  by the Food and Drug Administration.        Procedure: None  Procedures     I have reviewed and agree with the above PMH, PSH, FMH, social history, medications, allergies, and labs.      Assessment/Plan:   Problem List Items Addressed This Visit       Benign prostatic hyperplasia with nocturia    Bladder calculus - Primary    Relevant Orders    XR abdomen kub (Completed)       Health Maintenance:   Health Maintenance Due   Topic Date Due    COLORECTAL CANCER SCREENING  Never done    Hepatitis B (1 of 3 - Risk 3-dose series) Never done    ANNUAL WELLNESS VISIT  Never done    Pneumococcal Vaccine 65+ (1 of 1 - PCV) Never done    COVID-19 Vaccine (4 - 2023-24 season) 09/01/2023    INFLUENZA VACCINE  08/01/2024        Smoking Counseling: Former smoker.  Never used smokeless tobacco.  Counseling given.    Urine Incontinence: Patient reports that he is not currently experiencing any symptoms of urinary incontinence.    Patient was given instructions and counseling regarding his condition or for health maintenance advice. Please see specific information pulled into the AVS if appropriate.    Patient Education:   Bladder calculus -patient is status post cystoscopy with bladder stone extraction in December 2023.  X-ray today in office was unremarkable and recommend to continue with observation.  Advised him of lower urinary tract symptoms worsen to call and we will complete a stat CT scan or repeat x-ray.  He verbalized understanding.  BPH/detrusor instability -patient has very minimal lower urinary tract symptoms and has no new complaints at this time.  Will continue with observation.  He will contact his PCP for most recent PSA result.  Vies him return to the clinic sooner if needed otherwise we will place him back in 6 months.    Visit Diagnoses:    ICD-10-CM ICD-9-CM   1. Bladder calculus  N21.0 594.1   2. Benign prostatic hyperplasia with nocturia  N40.1 600.01    R35.1 788.43     A total of 15 minutes were spent coordinating this patient’s care in clinic today; 10 minutes of which were face-to-face with the patient, reviewing medical history and counseling on the current treatment and followup  plan.  All questions were answered to patient's satisfaction.    Meds Ordered During Visit:  No orders of the defined types were placed in this encounter.      Follow Up Appointment: 6 months  No follow-ups on file.      This document has been electronically signed by Leif Alfaro PA-C   August 15, 2024 10:39 EDT    Part of this note may be an electronic transcription/translation of spoken language to printed text using the Dragon Dictation System.

## 2025-01-14 ENCOUNTER — OFFICE VISIT (OUTPATIENT)
Dept: SURGERY | Facility: CLINIC | Age: 70
End: 2025-01-14
Payer: COMMERCIAL

## 2025-01-14 VITALS
WEIGHT: 165.6 LBS | SYSTOLIC BLOOD PRESSURE: 128 MMHG | DIASTOLIC BLOOD PRESSURE: 74 MMHG | HEIGHT: 72 IN | BODY MASS INDEX: 22.43 KG/M2

## 2025-01-14 DIAGNOSIS — K40.90 NON-RECURRENT UNILATERAL INGUINAL HERNIA WITHOUT OBSTRUCTION OR GANGRENE: Primary | ICD-10-CM

## 2025-01-14 RX ORDER — SODIUM CHLORIDE 9 MG/ML
40 INJECTION, SOLUTION INTRAVENOUS AS NEEDED
OUTPATIENT
Start: 2025-01-14

## 2025-01-14 RX ORDER — SODIUM CHLORIDE 0.9 % (FLUSH) 0.9 %
10 SYRINGE (ML) INJECTION AS NEEDED
OUTPATIENT
Start: 2025-01-14

## 2025-01-14 RX ORDER — SODIUM CHLORIDE 0.9 % (FLUSH) 0.9 %
10 SYRINGE (ML) INJECTION EVERY 12 HOURS SCHEDULED
OUTPATIENT
Start: 2025-01-14

## 2025-01-15 NOTE — H&P (VIEW-ONLY)
Subjective   Jose Maier is a 69 y.o. male is being seen for consultation today at the request of Luisa Connors APRN    Jose Maier is a 69 y.o. male with right groin bulge that is worsening over the last 2 to 3 months.  On examination he has a large reducible right inguinal hernia that is managed with a hernia belt.  Possible left inguinal hernia but no protruding contents at this time.  He is on no anticoagulation.  No previous hernia surgery reported.    History of Present Illness      Past Medical History:   Diagnosis Date    Arthritis     lower spine     Chaudhry esophagus     Colon polyp     Dr. Gerald Diaz    Fissuayleen, anal 1996    Surgeon Dr. Alfaro    GERD (gastroesophageal reflux disease)     Hiatal hernia     Hypertension     Liver disease     cirrhois    PONV (postoperative nausea and vomiting)     Renal calcification     Thyroid disease        Family History   Problem Relation Age of Onset    Colon cancer Father     Cancer Father         Colon Cancer    Alcohol abuse Father     Throat cancer Mother     Cancer Mother         Thyroid Cancer    Cancer Brother     Heart disease Paternal Grandfather        Social History     Socioeconomic History    Marital status:    Tobacco Use    Smoking status: Former     Current packs/day: 0.00     Average packs/day: 0.5 packs/day for 3.0 years (1.5 ttl pk-yrs)     Types: Cigarettes     Start date: 1976     Quit date: 1979     Years since quittin.6    Smokeless tobacco: Never   Vaping Use    Vaping status: Never Used   Substance and Sexual Activity    Alcohol use: Not Currently     Alcohol/week: 1.0 standard drink of alcohol     Types: 1 Cans of beer per week     Comment: 1/2 beer every now and then former bourbon drinker several shots per day    Drug use: No    Sexual activity: Yes     Partners: Female     Birth control/protection: None       Past Surgical History:   Procedure Laterality Date    COLONOSCOPY      CYSTOLITHALOPAXY PERCUTANEOUS  "N/A 05/25/2022    Procedure: CYSTOLITHOLAPAXY WITH LASER;  Surgeon: Deuce Campbell MD;  Location: [facility] OR;  Service: Urology;  Laterality: N/A;  with removal of residual tissue    CYSTOSCOPY LITHOLAPAXY BLADDER STONE EXTRACTION N/A 01/25/2023    Procedure: CYSTOSCOPY LITHOLAPAXY BLADDER STONE EXTRACTION;  Surgeon: Deuce Campbell MD;  Location:  [facility] OR;  Service: Urology;  Laterality: N/A;    CYSTOSCOPY TRANSURETHRAL RESECTION OF PROSTATE N/A 07/24/2019    Procedure: Cystoscopy and transurethral resection of the prostate, removal of bladder stone;  Surgeon: Deuce Campbell MD;  Location: [facility] OR;  Service: Urology    ENDOSCOPY      ESOPHAGEAL VARICES LIGATION      EYE SURGERY      Cataracts Dr. Valentin    KIDNEY STONE SURGERY      SEPTOPLASTY      TRIGGER FINGER RELEASE Right        Review of Systems   Constitutional:  Negative for activity change, appetite change, chills and fever.   HENT:  Negative for sore throat and trouble swallowing.    Eyes:  Negative for visual disturbance.   Respiratory:  Negative for cough and shortness of breath.    Cardiovascular:  Negative for chest pain and palpitations.   Gastrointestinal:  Negative for abdominal distention, abdominal pain, blood in stool, constipation, diarrhea, nausea and vomiting.   Endocrine: Negative for cold intolerance and heat intolerance.   Genitourinary:  Negative for dysuria.   Musculoskeletal:  Negative for joint swelling.   Skin:  Negative for color change, rash and wound.   Allergic/Immunologic: Negative for immunocompromised state.   Neurological:  Negative for dizziness, seizures, weakness and headaches.   Hematological:  Negative for adenopathy. Does not bruise/bleed easily.   Psychiatric/Behavioral:  Negative for agitation and confusion.        Results        /74   Ht 182.9 cm (72\")   Wt 75.1 kg (165 lb 9.6 oz)   BMI 22.46 kg/m²   Objective   Physical Exam  Constitutional:       Appearance: He is well-developed. "   HENT:      Head: Normocephalic and atraumatic.   Eyes:      Conjunctiva/sclera: Conjunctivae normal.      Pupils: Pupils are equal, round, and reactive to light.   Neck:      Thyroid: No thyromegaly.      Vascular: No JVD.      Trachea: No tracheal deviation.   Cardiovascular:      Rate and Rhythm: Normal rate and regular rhythm.      Heart sounds: No murmur heard.     No friction rub. No gallop.   Pulmonary:      Effort: Pulmonary effort is normal.      Breath sounds: Normal breath sounds.   Abdominal:      General: There is no distension.      Palpations: Abdomen is soft. There is no hepatomegaly or splenomegaly.      Tenderness: There is no abdominal tenderness.      Hernia: No hernia is present. Hernia is present in the right inguinal area.   Musculoskeletal:         General: No deformity. Normal range of motion.      Cervical back: Neck supple.   Skin:     General: Skin is warm and dry.   Neurological:      Mental Status: He is alert and oriented to person, place, and time.       Physical Exam      Assessment & Plan            Assessment   Diagnoses and all orders for this visit:    1. Non-recurrent unilateral inguinal hernia without obstruction or gangrene (Primary)  -     Case Request; Standing  -     sodium chloride 0.9 % flush 10 mL  -     sodium chloride 0.9 % flush 10 mL  -     sodium chloride 0.9 % infusion 40 mL  -     ceFAZolin 2000 mg IVPB in 100 mL NS (VTB)  -     Case Request    Other orders  -     Follow Anesthesia Guidelines / Protocol; Future  -     Follow Anesthesia Guidelines / Protocol; Standing  -     Verify / Perform Chlorhexidine Skin Prep; Standing  -     Provide NPO Instructions to Patient; Future  -     Chlorhexidine Skin Prep; Future  -     Instructions on coughing, deep breathing, and incentive spirometry.; Standing  -     Insert Peripheral IV; Standing  -     Saline Lock & Maintain IV Access; Standing  -     Place Sequential Compression Device; Standing  -     Maintain Sequential  Compression Device; Standing        Assessment & Plan      Jose Maier is a 69 y.o. male with reducible right possible bilateral inguinal hernia.  After discussion of the risks and benefits patient will proceed with robotic inguinal hernia repair with mesh.    BMI is within normal parameters. No other follow-up for BMI required.            This document has been electronically signed by Howie Greene MD   January 15, 2025 12:26 EST    Patient or patient representative verbalized consent for the use of Ambient Listening during the visit with  Howie Greene MD for chart documentation. 1/15/2025  12:27 EST

## 2025-01-15 NOTE — PROGRESS NOTES
Subjective   Jose Maier is a 69 y.o. male is being seen for consultation today at the request of Luisa Connors APRN    Jose Maier is a 69 y.o. male with right groin bulge that is worsening over the last 2 to 3 months.  On examination he has a large reducible right inguinal hernia that is managed with a hernia belt.  Possible left inguinal hernia but no protruding contents at this time.  He is on no anticoagulation.  No previous hernia surgery reported.    History of Present Illness      Past Medical History:   Diagnosis Date    Arthritis     lower spine     Chaudhry esophagus     Colon polyp     Dr. Gerald Diaz    Fissuayleen, anal 1996    Surgeon Dr. Alfaro    GERD (gastroesophageal reflux disease)     Hiatal hernia     Hypertension     Liver disease     cirrhois    PONV (postoperative nausea and vomiting)     Renal calcification     Thyroid disease        Family History   Problem Relation Age of Onset    Colon cancer Father     Cancer Father         Colon Cancer    Alcohol abuse Father     Throat cancer Mother     Cancer Mother         Thyroid Cancer    Cancer Brother     Heart disease Paternal Grandfather        Social History     Socioeconomic History    Marital status:    Tobacco Use    Smoking status: Former     Current packs/day: 0.00     Average packs/day: 0.5 packs/day for 3.0 years (1.5 ttl pk-yrs)     Types: Cigarettes     Start date: 1976     Quit date: 1979     Years since quittin.6    Smokeless tobacco: Never   Vaping Use    Vaping status: Never Used   Substance and Sexual Activity    Alcohol use: Not Currently     Alcohol/week: 1.0 standard drink of alcohol     Types: 1 Cans of beer per week     Comment: 1/2 beer every now and then former bourbon drinker several shots per day    Drug use: No    Sexual activity: Yes     Partners: Female     Birth control/protection: None       Past Surgical History:   Procedure Laterality Date    COLONOSCOPY      CYSTOLITHALOPAXY PERCUTANEOUS  "N/A 05/25/2022    Procedure: CYSTOLITHOLAPAXY WITH LASER;  Surgeon: Deuce Campbell MD;  Location: [facility] OR;  Service: Urology;  Laterality: N/A;  with removal of residual tissue    CYSTOSCOPY LITHOLAPAXY BLADDER STONE EXTRACTION N/A 01/25/2023    Procedure: CYSTOSCOPY LITHOLAPAXY BLADDER STONE EXTRACTION;  Surgeon: Deuce Campbell MD;  Location:  [facility] OR;  Service: Urology;  Laterality: N/A;    CYSTOSCOPY TRANSURETHRAL RESECTION OF PROSTATE N/A 07/24/2019    Procedure: Cystoscopy and transurethral resection of the prostate, removal of bladder stone;  Surgeon: Deuce Campbell MD;  Location: [facility] OR;  Service: Urology    ENDOSCOPY      ESOPHAGEAL VARICES LIGATION      EYE SURGERY      Cataracts Dr. Valentin    KIDNEY STONE SURGERY      SEPTOPLASTY      TRIGGER FINGER RELEASE Right        Review of Systems   Constitutional:  Negative for activity change, appetite change, chills and fever.   HENT:  Negative for sore throat and trouble swallowing.    Eyes:  Negative for visual disturbance.   Respiratory:  Negative for cough and shortness of breath.    Cardiovascular:  Negative for chest pain and palpitations.   Gastrointestinal:  Negative for abdominal distention, abdominal pain, blood in stool, constipation, diarrhea, nausea and vomiting.   Endocrine: Negative for cold intolerance and heat intolerance.   Genitourinary:  Negative for dysuria.   Musculoskeletal:  Negative for joint swelling.   Skin:  Negative for color change, rash and wound.   Allergic/Immunologic: Negative for immunocompromised state.   Neurological:  Negative for dizziness, seizures, weakness and headaches.   Hematological:  Negative for adenopathy. Does not bruise/bleed easily.   Psychiatric/Behavioral:  Negative for agitation and confusion.        Results        /74   Ht 182.9 cm (72\")   Wt 75.1 kg (165 lb 9.6 oz)   BMI 22.46 kg/m²   Objective   Physical Exam  Constitutional:       Appearance: He is well-developed. "   HENT:      Head: Normocephalic and atraumatic.   Eyes:      Conjunctiva/sclera: Conjunctivae normal.      Pupils: Pupils are equal, round, and reactive to light.   Neck:      Thyroid: No thyromegaly.      Vascular: No JVD.      Trachea: No tracheal deviation.   Cardiovascular:      Rate and Rhythm: Normal rate and regular rhythm.      Heart sounds: No murmur heard.     No friction rub. No gallop.   Pulmonary:      Effort: Pulmonary effort is normal.      Breath sounds: Normal breath sounds.   Abdominal:      General: There is no distension.      Palpations: Abdomen is soft. There is no hepatomegaly or splenomegaly.      Tenderness: There is no abdominal tenderness.      Hernia: No hernia is present. Hernia is present in the right inguinal area.   Musculoskeletal:         General: No deformity. Normal range of motion.      Cervical back: Neck supple.   Skin:     General: Skin is warm and dry.   Neurological:      Mental Status: He is alert and oriented to person, place, and time.       Physical Exam      Assessment & Plan            Assessment   Diagnoses and all orders for this visit:    1. Non-recurrent unilateral inguinal hernia without obstruction or gangrene (Primary)  -     Case Request; Standing  -     sodium chloride 0.9 % flush 10 mL  -     sodium chloride 0.9 % flush 10 mL  -     sodium chloride 0.9 % infusion 40 mL  -     ceFAZolin 2000 mg IVPB in 100 mL NS (VTB)  -     Case Request    Other orders  -     Follow Anesthesia Guidelines / Protocol; Future  -     Follow Anesthesia Guidelines / Protocol; Standing  -     Verify / Perform Chlorhexidine Skin Prep; Standing  -     Provide NPO Instructions to Patient; Future  -     Chlorhexidine Skin Prep; Future  -     Instructions on coughing, deep breathing, and incentive spirometry.; Standing  -     Insert Peripheral IV; Standing  -     Saline Lock & Maintain IV Access; Standing  -     Place Sequential Compression Device; Standing  -     Maintain Sequential  Compression Device; Standing        Assessment & Plan      Jose Maier is a 69 y.o. male with reducible right possible bilateral inguinal hernia.  After discussion of the risks and benefits patient will proceed with robotic inguinal hernia repair with mesh.    BMI is within normal parameters. No other follow-up for BMI required.            This document has been electronically signed by Howie Greene MD   January 15, 2025 12:26 EST    Patient or patient representative verbalized consent for the use of Ambient Listening during the visit with  Howie Greene MD for chart documentation. 1/15/2025  12:27 EST     Started first trimester

## 2025-02-06 ENCOUNTER — TELEPHONE (OUTPATIENT)
Dept: SURGERY | Facility: CLINIC | Age: 70
End: 2025-02-06
Payer: MEDICARE

## 2025-02-06 NOTE — TELEPHONE ENCOUNTER
You are scheduled for surgery with Dr. Greene on 2/13/25 at 8:00am.   Do not eat/drink anything after midnight the night prior to surgery, and you must have a  present with you on day of surgery.  An appointment for pre-op has been scheduled for 2/11/25 at 11:30am. This is a visit with surgical nurses and the anesthesia team to draw blood work and review your medical history and current medications.  Arrive at outpatient surgery on the ground floor of the hospital both of these days. Outpatient surgery is located on the opposite side of the ER location. Follow the arrows for surgery on the [facility] signs posted along the [facility] hill. If you have any questions please call the office at [phone].

## 2025-02-10 NOTE — DISCHARGE INSTRUCTIONS
Please discontinue all blood thinners and anticoagulants (except aspirin) prior to surgery as per your surgeon and cardiologist instructions.  Aspirin may be continued up to the day prior to surgery.    HOLD all diabetic medications the morning of surgery as order by physician.    Please follow instructions on use of prep cloths provided by nurse. Return instruction sheet to pre-op nurse on day of surgery.    Arrival time for surgery on  2/13/25 will be given to you by Dr. Greene's  Office.(0800 AM)    A RESPONSIBLE PERSON MUST REMAIN IN THE WAITING ROOM DURING YOUR PROCEDURE AND A RESPONSIBLE  MUST BE AVAILABLE UPON YOUR DISCHARGE.    General Instructions:  Do NOT eat or drink after midnight 2/12/25 which includes water, mints, or gum.  You may brush your teeth. Dental appliances that are removable must be taken out day of surgery.  Do NOT smoke, chew tobacco, or drink alcohol within 24 hours prior to surgery.  Bring medications in original bottles, any inhalers and if applicable your C-PAP/BI-PAP machine  Bring any papers given to you in the doctor’s office  Wear clean, comfortable clothes and socks  Do NOT wear contact lenses or make-up or dark nail polish.  Bring a case for your glasses if applicable.  Bring crutches or walker if applicable  Leave all other valuables and jewelry at home  If you were given a blood bank armband, continue to wear it until discharged.    Preventing a Surgical Site Infection:  Shower the night before surgery (unless instructed otherwise) using a fresh bar of anti-bacterial soap (such as Dial) and clean washcloth.  Dry with a clean towel and dress in clean clothing.  For 2 to 3 days before surgery, avoid shaving with a razor near where you will have surgery because the razor can irritate skin and make it easier to develop an infection.  Ask your surgeon if you will be receiving antibiotics prior to surgery.  Make sure you, your family, and all healthcare providers clean their  hands with soap and water or an alcohol-based hand  before caring for you or your wound.  If at all possible, quit smoking as many days before surgery as you can.    Day of Surgery:  Upon arrival, a pre-op nurse and anesthesiologist will review your health history, obtain vital signs, and answer questions you may have.  The only belongings needed at this time will be your home medications and if applicable you C-PAP/BI-PAP machine.  If you are staying overnight, your family can leave the rest of your belongings in the car and bring them to your room later.  A pre-op nurse will start an IV and you may receive medication in preparation for surgery.  Due to patient privacy and limited space, only one member of your family will be able to accompany you in the pre-op area.  While you are in surgery your family should notify the waiting room  if they leave the waiting room area and provide a contact number.  Please be aware that surgery does come with discomfort.  We want to make every effort to control your discomfort so please discuss any uncontrolled symptoms with your nurse.  Your doctor will most likely have prescribed pain medications.  If you are going home after surgery you will receive individualized written care instructions before being discharged.  A responsible adult must drive you to and from the hospital on the day of surgery and stay with you for 24 hours.  If you are staying overnight following surgery, you will be transported to your hospital room following the recovery period.

## 2025-02-11 ENCOUNTER — PRE-ADMISSION TESTING (OUTPATIENT)
Dept: PREADMISSION TESTING | Facility: HOSPITAL | Age: 70
End: 2025-02-11
Payer: COMMERCIAL

## 2025-02-11 LAB
ANION GAP SERPL CALCULATED.3IONS-SCNC: 8.2 MMOL/L (ref 5–15)
BUN SERPL-MCNC: 12 MG/DL (ref 8–23)
BUN/CREAT SERPL: 15.2 (ref 7–25)
CALCIUM SPEC-SCNC: 9 MG/DL (ref 8.6–10.5)
CHLORIDE SERPL-SCNC: 104 MMOL/L (ref 98–107)
CO2 SERPL-SCNC: 27.8 MMOL/L (ref 22–29)
CREAT SERPL-MCNC: 0.79 MG/DL (ref 0.76–1.27)
DEPRECATED RDW RBC AUTO: 46 FL (ref 37–54)
EGFRCR SERPLBLD CKD-EPI 2021: 96.8 ML/MIN/1.73
ERYTHROCYTE [DISTWIDTH] IN BLOOD BY AUTOMATED COUNT: 13.4 % (ref 12.3–15.4)
GLUCOSE SERPL-MCNC: 90 MG/DL (ref 65–99)
HCT VFR BLD AUTO: 38.3 % (ref 37.5–51)
HGB BLD-MCNC: 12.4 G/DL (ref 13–17.7)
MCH RBC QN AUTO: 30.1 PG (ref 26.6–33)
MCHC RBC AUTO-ENTMCNC: 32.4 G/DL (ref 31.5–35.7)
MCV RBC AUTO: 93 FL (ref 79–97)
PLATELET # BLD AUTO: 170 10*3/MM3 (ref 140–450)
PMV BLD AUTO: 10.9 FL (ref 6–12)
POTASSIUM SERPL-SCNC: 4.8 MMOL/L (ref 3.5–5.2)
RBC # BLD AUTO: 4.12 10*6/MM3 (ref 4.14–5.8)
SODIUM SERPL-SCNC: 140 MMOL/L (ref 136–145)
WBC NRBC COR # BLD AUTO: 5.47 10*3/MM3 (ref 3.4–10.8)

## 2025-02-11 PROCEDURE — 36415 COLL VENOUS BLD VENIPUNCTURE: CPT

## 2025-02-11 PROCEDURE — 85027 COMPLETE CBC AUTOMATED: CPT

## 2025-02-11 PROCEDURE — 80048 BASIC METABOLIC PNL TOTAL CA: CPT

## 2025-02-11 RX ORDER — DOXYCYCLINE 100 MG/1
100 CAPSULE ORAL 2 TIMES DAILY
COMMUNITY

## 2025-02-13 ENCOUNTER — ANESTHESIA (OUTPATIENT)
Dept: PERIOP | Facility: HOSPITAL | Age: 70
End: 2025-02-13
Payer: COMMERCIAL

## 2025-02-13 ENCOUNTER — APPOINTMENT (OUTPATIENT)
Dept: GENERAL RADIOLOGY | Facility: HOSPITAL | Age: 70
End: 2025-02-13
Payer: COMMERCIAL

## 2025-02-13 ENCOUNTER — HOSPITAL ENCOUNTER (OUTPATIENT)
Facility: HOSPITAL | Age: 70
Setting detail: HOSPITAL OUTPATIENT SURGERY
Discharge: HOME | End: 2025-02-13
Attending: SURGERY | Admitting: SURGERY
Payer: COMMERCIAL

## 2025-02-13 ENCOUNTER — ANESTHESIA EVENT (OUTPATIENT)
Dept: PERIOP | Facility: HOSPITAL | Age: 70
End: 2025-02-13
Payer: COMMERCIAL

## 2025-02-13 VITALS
OXYGEN SATURATION: 98 % | WEIGHT: 163 LBS | SYSTOLIC BLOOD PRESSURE: 141 MMHG | BODY MASS INDEX: 22.08 KG/M2 | TEMPERATURE: 97.3 F | RESPIRATION RATE: 18 BRPM | DIASTOLIC BLOOD PRESSURE: 86 MMHG | HEART RATE: 63 BPM | HEIGHT: 72 IN

## 2025-02-13 DIAGNOSIS — K40.90 NON-RECURRENT UNILATERAL INGUINAL HERNIA WITHOUT OBSTRUCTION OR GANGRENE: ICD-10-CM

## 2025-02-13 PROCEDURE — 25810000003 LACTATED RINGERS PER 1000 ML: Performed by: ANESTHESIOLOGY

## 2025-02-13 PROCEDURE — 25010000002 BUPRENORPHINE PER 0.1 MG: Performed by: NURSE ANESTHETIST, CERTIFIED REGISTERED

## 2025-02-13 PROCEDURE — 25010000002 PROCHLORPERAZINE 10 MG/2ML SOLUTION: Performed by: ANESTHESIOLOGY

## 2025-02-13 PROCEDURE — 25010000002 ONDANSETRON PER 1 MG: Performed by: NURSE ANESTHETIST, CERTIFIED REGISTERED

## 2025-02-13 PROCEDURE — 25010000002 CEFAZOLIN PER 500 MG: Performed by: SURGERY

## 2025-02-13 PROCEDURE — C1781 MESH (IMPLANTABLE): HCPCS | Performed by: SURGERY

## 2025-02-13 PROCEDURE — 25010000002 HYDRALAZINE PER 20 MG: Performed by: NURSE ANESTHETIST, CERTIFIED REGISTERED

## 2025-02-13 PROCEDURE — 25010000002 ROPIVACAINE PER 1 MG: Performed by: NURSE ANESTHETIST, CERTIFIED REGISTERED

## 2025-02-13 PROCEDURE — 25010000002 FENTANYL CITRATE (PF) 50 MCG/ML SOLUTION: Performed by: NURSE ANESTHETIST, CERTIFIED REGISTERED

## 2025-02-13 PROCEDURE — 49650 LAP ING HERNIA REPAIR INIT: CPT | Performed by: SURGERY

## 2025-02-13 PROCEDURE — 25010000002 DEXAMETHASONE PER 1 MG: Performed by: NURSE ANESTHETIST, CERTIFIED REGISTERED

## 2025-02-13 PROCEDURE — 25010000002 PROPOFOL 200 MG/20ML EMULSION: Performed by: NURSE ANESTHETIST, CERTIFIED REGISTERED

## 2025-02-13 PROCEDURE — 25010000002 NEOSTIGMINE 10 MG/10ML SOLUTION: Performed by: NURSE ANESTHETIST, CERTIFIED REGISTERED

## 2025-02-13 PROCEDURE — 25010000002 GLYCOPYRROLATE 0.4 MG/2ML SOLUTION: Performed by: NURSE ANESTHETIST, CERTIFIED REGISTERED

## 2025-02-13 PROCEDURE — 63710000001 ONDANSETRON ODT 4 MG TABLET DISPERSIBLE: Performed by: ANESTHESIOLOGY

## 2025-02-13 PROCEDURE — 25010000002 KETOROLAC TROMETHAMINE PER 15 MG: Performed by: NURSE ANESTHETIST, CERTIFIED REGISTERED

## 2025-02-13 PROCEDURE — 25010000002 LIDOCAINE PF 2% 2 % SOLUTION: Performed by: NURSE ANESTHETIST, CERTIFIED REGISTERED

## 2025-02-13 DEVICE — ABSORBABLE WOUND CLOSURE DEVICE
Type: IMPLANTABLE DEVICE | Site: ABDOMEN | Status: FUNCTIONAL
Brand: V-LOC 90

## 2025-02-13 DEVICE — 3DMAX MID ANATOMICAL MESH, 10 CM X 16 CM (4" X 6"), LARGE, RIGHT
Type: IMPLANTABLE DEVICE | Site: ABDOMEN | Status: FUNCTIONAL
Brand: 3DMAX

## 2025-02-13 RX ORDER — FENTANYL CITRATE 50 UG/ML
INJECTION, SOLUTION INTRAMUSCULAR; INTRAVENOUS AS NEEDED
Status: DISCONTINUED | OUTPATIENT
Start: 2025-02-13 | End: 2025-02-13 | Stop reason: SURG

## 2025-02-13 RX ORDER — SCOPOLAMINE 1 MG/3D
1 PATCH, EXTENDED RELEASE TRANSDERMAL
Status: DISCONTINUED | OUTPATIENT
Start: 2025-02-13 | End: 2025-02-13 | Stop reason: HOSPADM

## 2025-02-13 RX ORDER — BUPRENORPHINE HYDROCHLORIDE 0.32 MG/ML
INJECTION INTRAMUSCULAR; INTRAVENOUS
Status: COMPLETED | OUTPATIENT
Start: 2025-02-13 | End: 2025-02-13

## 2025-02-13 RX ORDER — SODIUM CHLORIDE 0.9 % (FLUSH) 0.9 %
10 SYRINGE (ML) INJECTION AS NEEDED
Status: DISCONTINUED | OUTPATIENT
Start: 2025-02-13 | End: 2025-02-13 | Stop reason: HOSPADM

## 2025-02-13 RX ORDER — KETOROLAC TROMETHAMINE 30 MG/ML
15 INJECTION, SOLUTION INTRAMUSCULAR; INTRAVENOUS EVERY 6 HOURS PRN
Status: COMPLETED | OUTPATIENT
Start: 2025-02-13 | End: 2025-02-13

## 2025-02-13 RX ORDER — HYDRALAZINE HYDROCHLORIDE 20 MG/ML
INJECTION INTRAMUSCULAR; INTRAVENOUS AS NEEDED
Status: DISCONTINUED | OUTPATIENT
Start: 2025-02-13 | End: 2025-02-13 | Stop reason: SURG

## 2025-02-13 RX ORDER — MEPERIDINE HYDROCHLORIDE 25 MG/ML
12.5 INJECTION INTRAMUSCULAR; INTRAVENOUS; SUBCUTANEOUS
Status: DISCONTINUED | OUTPATIENT
Start: 2025-02-13 | End: 2025-02-13 | Stop reason: HOSPADM

## 2025-02-13 RX ORDER — LIDOCAINE HYDROCHLORIDE 20 MG/ML
INJECTION, SOLUTION EPIDURAL; INFILTRATION; INTRACAUDAL; PERINEURAL AS NEEDED
Status: DISCONTINUED | OUTPATIENT
Start: 2025-02-13 | End: 2025-02-13 | Stop reason: SURG

## 2025-02-13 RX ORDER — TRAMADOL HYDROCHLORIDE 50 MG/1
50 TABLET ORAL EVERY 8 HOURS PRN
Qty: 12 TABLET | Refills: 0 | Status: SHIPPED | OUTPATIENT
Start: 2025-02-13

## 2025-02-13 RX ORDER — SODIUM CHLORIDE, SODIUM LACTATE, POTASSIUM CHLORIDE, CALCIUM CHLORIDE 600; 310; 30; 20 MG/100ML; MG/100ML; MG/100ML; MG/100ML
100 INJECTION, SOLUTION INTRAVENOUS ONCE AS NEEDED
Status: DISCONTINUED | OUTPATIENT
Start: 2025-02-13 | End: 2025-02-13 | Stop reason: HOSPADM

## 2025-02-13 RX ORDER — IBUPROFEN 600 MG/1
600 TABLET, FILM COATED ORAL EVERY 6 HOURS PRN
Qty: 30 TABLET | Refills: 0 | Status: SHIPPED | OUTPATIENT
Start: 2025-02-13

## 2025-02-13 RX ORDER — SODIUM CHLORIDE 9 MG/ML
40 INJECTION, SOLUTION INTRAVENOUS AS NEEDED
Status: DISCONTINUED | OUTPATIENT
Start: 2025-02-13 | End: 2025-02-13 | Stop reason: HOSPADM

## 2025-02-13 RX ORDER — IPRATROPIUM BROMIDE AND ALBUTEROL SULFATE 2.5; .5 MG/3ML; MG/3ML
3 SOLUTION RESPIRATORY (INHALATION) ONCE AS NEEDED
Status: DISCONTINUED | OUTPATIENT
Start: 2025-02-13 | End: 2025-02-13 | Stop reason: HOSPADM

## 2025-02-13 RX ORDER — ROCURONIUM BROMIDE 10 MG/ML
INJECTION, SOLUTION INTRAVENOUS AS NEEDED
Status: DISCONTINUED | OUTPATIENT
Start: 2025-02-13 | End: 2025-02-13 | Stop reason: SURG

## 2025-02-13 RX ORDER — ROPIVACAINE HYDROCHLORIDE 5 MG/ML
INJECTION, SOLUTION EPIDURAL; INFILTRATION; PERINEURAL
Status: COMPLETED | OUTPATIENT
Start: 2025-02-13 | End: 2025-02-13

## 2025-02-13 RX ORDER — DEXAMETHASONE SODIUM PHOSPHATE 4 MG/ML
INJECTION, SOLUTION INTRA-ARTICULAR; INTRALESIONAL; INTRAMUSCULAR; INTRAVENOUS; SOFT TISSUE
Status: COMPLETED | OUTPATIENT
Start: 2025-02-13 | End: 2025-02-13

## 2025-02-13 RX ORDER — ONDANSETRON 4 MG/1
4 TABLET, ORALLY DISINTEGRATING ORAL ONCE
Status: COMPLETED | OUTPATIENT
Start: 2025-02-13 | End: 2025-02-13

## 2025-02-13 RX ORDER — MIDAZOLAM HYDROCHLORIDE 1 MG/ML
0.5 INJECTION, SOLUTION INTRAMUSCULAR; INTRAVENOUS
Status: DISCONTINUED | OUTPATIENT
Start: 2025-02-13 | End: 2025-02-13 | Stop reason: HOSPADM

## 2025-02-13 RX ORDER — SODIUM CHLORIDE 0.9 % (FLUSH) 0.9 %
10 SYRINGE (ML) INJECTION EVERY 12 HOURS SCHEDULED
Status: DISCONTINUED | OUTPATIENT
Start: 2025-02-13 | End: 2025-02-13 | Stop reason: HOSPADM

## 2025-02-13 RX ORDER — VITAMINS A AND D
1 CAPSULE ORAL DAILY
COMMUNITY

## 2025-02-13 RX ORDER — SODIUM CHLORIDE, SODIUM LACTATE, POTASSIUM CHLORIDE, CALCIUM CHLORIDE 600; 310; 30; 20 MG/100ML; MG/100ML; MG/100ML; MG/100ML
125 INJECTION, SOLUTION INTRAVENOUS ONCE
Status: COMPLETED | OUTPATIENT
Start: 2025-02-13 | End: 2025-02-13

## 2025-02-13 RX ORDER — PROCHLORPERAZINE EDISYLATE 5 MG/ML
2.5 INJECTION INTRAMUSCULAR; INTRAVENOUS ONCE
Status: COMPLETED | OUTPATIENT
Start: 2025-02-13 | End: 2025-02-13

## 2025-02-13 RX ORDER — FAMOTIDINE 10 MG/ML
INJECTION, SOLUTION INTRAVENOUS AS NEEDED
Status: DISCONTINUED | OUTPATIENT
Start: 2025-02-13 | End: 2025-02-13 | Stop reason: SURG

## 2025-02-13 RX ORDER — ONDANSETRON 2 MG/ML
INJECTION INTRAMUSCULAR; INTRAVENOUS AS NEEDED
Status: DISCONTINUED | OUTPATIENT
Start: 2025-02-13 | End: 2025-02-13 | Stop reason: SURG

## 2025-02-13 RX ORDER — FENTANYL CITRATE 50 UG/ML
50 INJECTION, SOLUTION INTRAMUSCULAR; INTRAVENOUS
Status: DISCONTINUED | OUTPATIENT
Start: 2025-02-13 | End: 2025-02-13 | Stop reason: HOSPADM

## 2025-02-13 RX ORDER — GLYCOPYRROLATE 0.2 MG/ML
INJECTION INTRAMUSCULAR; INTRAVENOUS AS NEEDED
Status: DISCONTINUED | OUTPATIENT
Start: 2025-02-13 | End: 2025-02-13 | Stop reason: SURG

## 2025-02-13 RX ORDER — OXYCODONE AND ACETAMINOPHEN 5; 325 MG/1; MG/1
1 TABLET ORAL ONCE AS NEEDED
Status: COMPLETED | OUTPATIENT
Start: 2025-02-13 | End: 2025-02-13

## 2025-02-13 RX ORDER — PROPOFOL 10 MG/ML
INJECTION, EMULSION INTRAVENOUS AS NEEDED
Status: DISCONTINUED | OUTPATIENT
Start: 2025-02-13 | End: 2025-02-13 | Stop reason: SURG

## 2025-02-13 RX ORDER — NEOSTIGMINE METHYLSULFATE 1 MG/ML
INJECTION INTRAVENOUS AS NEEDED
Status: DISCONTINUED | OUTPATIENT
Start: 2025-02-13 | End: 2025-02-13 | Stop reason: SURG

## 2025-02-13 RX ORDER — ACETAMINOPHEN 325 MG/1
650 TABLET ORAL EVERY 4 HOURS PRN
Qty: 30 TABLET | Refills: 0 | Status: SHIPPED | OUTPATIENT
Start: 2025-02-13

## 2025-02-13 RX ORDER — EPHEDRINE SULFATE 5 MG/ML
INJECTION INTRAVENOUS AS NEEDED
Status: DISCONTINUED | OUTPATIENT
Start: 2025-02-13 | End: 2025-02-13 | Stop reason: SURG

## 2025-02-13 RX ORDER — ONDANSETRON 2 MG/ML
4 INJECTION INTRAMUSCULAR; INTRAVENOUS AS NEEDED
Status: DISCONTINUED | OUTPATIENT
Start: 2025-02-13 | End: 2025-02-13 | Stop reason: HOSPADM

## 2025-02-13 RX ADMIN — CEFAZOLIN 2000 MG: 2 INJECTION, POWDER, FOR SOLUTION INTRAMUSCULAR; INTRAVENOUS at 09:13

## 2025-02-13 RX ADMIN — FENTANYL CITRATE 50 MCG: 50 INJECTION, SOLUTION INTRAMUSCULAR; INTRAVENOUS at 10:45

## 2025-02-13 RX ADMIN — FENTANYL CITRATE 50 MCG: 50 INJECTION INTRAMUSCULAR; INTRAVENOUS at 09:30

## 2025-02-13 RX ADMIN — SCOPALAMINE 1 PATCH: 1 PATCH, EXTENDED RELEASE TRANSDERMAL at 10:49

## 2025-02-13 RX ADMIN — NEOSTIGMINE METHYLSULFATE 1 MG: 0.5 INJECTION INTRAVENOUS at 10:11

## 2025-02-13 RX ADMIN — KETOROLAC TROMETHAMINE 15 MG: 30 INJECTION, SOLUTION INTRAMUSCULAR; INTRAVENOUS at 10:58

## 2025-02-13 RX ADMIN — FENTANYL CITRATE 50 MCG: 50 INJECTION INTRAMUSCULAR; INTRAVENOUS at 09:15

## 2025-02-13 RX ADMIN — DEXAMETHASONE SODIUM PHOSPHATE 8 MG: 4 INJECTION, SOLUTION INTRA-ARTICULAR; INTRALESIONAL; INTRAMUSCULAR; INTRAVENOUS; SOFT TISSUE at 09:27

## 2025-02-13 RX ADMIN — EPHEDRINE SULFATE 5 MG: 5 INJECTION INTRAVENOUS at 09:18

## 2025-02-13 RX ADMIN — OXYCODONE HYDROCHLORIDE AND ACETAMINOPHEN 1 TABLET: 5; 325 TABLET ORAL at 10:46

## 2025-02-13 RX ADMIN — NEOSTIGMINE METHYLSULFATE 4 MG: 0.5 INJECTION INTRAVENOUS at 10:05

## 2025-02-13 RX ADMIN — ONDANSETRON 4 MG: 2 INJECTION INTRAMUSCULAR; INTRAVENOUS at 10:45

## 2025-02-13 RX ADMIN — ROPIVACAINE HYDROCHLORIDE 225 MG: 5 INJECTION, SOLUTION EPIDURAL; INFILTRATION; PERINEURAL at 09:27

## 2025-02-13 RX ADMIN — BUPRENORPHINE HYDROCHLORIDE 0.3 MG: 0.32 INJECTION INTRAMUSCULAR; INTRAVENOUS at 09:27

## 2025-02-13 RX ADMIN — PROCHLORPERAZINE EDISYLATE 2.5 MG: 5 INJECTION INTRAMUSCULAR; INTRAVENOUS at 11:21

## 2025-02-13 RX ADMIN — GLYCOPYRROLATE 0.6 MG: 0.2 INJECTION INTRAMUSCULAR; INTRAVENOUS at 10:05

## 2025-02-13 RX ADMIN — PROPOFOL 100 MG: 10 INJECTION, EMULSION INTRAVENOUS at 09:15

## 2025-02-13 RX ADMIN — HYDRALAZINE HYDROCHLORIDE 5 MG: 20 INJECTION INTRAMUSCULAR; INTRAVENOUS at 09:47

## 2025-02-13 RX ADMIN — HYDRALAZINE HYDROCHLORIDE 10 MG: 20 INJECTION INTRAMUSCULAR; INTRAVENOUS at 09:34

## 2025-02-13 RX ADMIN — EPHEDRINE SULFATE 10 MG: 5 INJECTION INTRAVENOUS at 09:21

## 2025-02-13 RX ADMIN — ROCURONIUM BROMIDE 35 MG: 10 SOLUTION INTRAVENOUS at 09:15

## 2025-02-13 RX ADMIN — GLYCOPYRROLATE 0.2 MG: 0.2 INJECTION INTRAMUSCULAR; INTRAVENOUS at 09:15

## 2025-02-13 RX ADMIN — LIDOCAINE HYDROCHLORIDE 60 MG: 20 INJECTION, SOLUTION EPIDURAL; INFILTRATION; INTRACAUDAL; PERINEURAL at 09:15

## 2025-02-13 RX ADMIN — ONDANSETRON 4 MG: 4 TABLET, ORALLY DISINTEGRATING ORAL at 12:14

## 2025-02-13 RX ADMIN — SODIUM CHLORIDE, POTASSIUM CHLORIDE, SODIUM LACTATE AND CALCIUM CHLORIDE: 600; 310; 30; 20 INJECTION, SOLUTION INTRAVENOUS at 09:13

## 2025-02-13 RX ADMIN — ONDANSETRON 4 MG: 2 INJECTION INTRAMUSCULAR; INTRAVENOUS at 10:08

## 2025-02-13 RX ADMIN — FAMOTIDINE 20 MG: 10 INJECTION, SOLUTION INTRAVENOUS at 09:12

## 2025-02-13 RX ADMIN — GLYCOPYRROLATE 0.4 MG: 0.2 INJECTION INTRAMUSCULAR; INTRAVENOUS at 10:11

## 2025-02-13 NOTE — ANESTHESIA POSTPROCEDURE EVALUATION
Patient: Jose Maier    Procedure Summary       Date: 02/13/25 Room / Location: [facility] OR 04 /  [facility] OR    Anesthesia Start: 0912 Anesthesia Stop: 1018    Procedure: INGUINAL HERNIA REPAIR LAPAROSCOPIC WITH DAVINCI ROBOT RIGHT (Right: Abdomen) Diagnosis:       Non-recurrent unilateral inguinal hernia without obstruction or gangrene      (Non-recurrent unilateral inguinal hernia without obstruction or gangrene [K40.90])    Surgeons: Howie Greene MD Provider: Abdi Ga MD    Anesthesia Type: general ASA Status: 3            Anesthesia Type: general    Vitals  Vitals Value Taken Time   /100 02/13/25 1101   Temp 96.8 °F (36 °C) 02/13/25 1020   Pulse 74 02/13/25 1105   Resp 18 02/13/25 1100   SpO2 94 % 02/13/25 1105   Vitals shown include unfiled device data.        Post Anesthesia Care and Evaluation    Patient location during evaluation: PACU  Patient participation: complete - patient participated  Level of consciousness: responsive to verbal stimuli  Pain score: 0  Pain management: satisfactory to patient    Airway patency: patent  Anesthetic complications: No anesthetic complications  PONV Status: none  Cardiovascular status: bradycardic and blood pressure returned to baseline  Respiratory status: nasal cannula  Hydration status: acceptable    Comments: Patient with very low heart rate but his Doctors want him on Corgard.  Pulse ran around low 40's in the pre op holding.

## 2025-02-13 NOTE — ANESTHESIA PREPROCEDURE EVALUATION
Anesthesia Evaluation     Patient summary reviewed and Nursing notes reviewed   history of anesthetic complications:   NPO Solid Status: > 8 hours  NPO Liquid Status: > 8 hours           Airway   Mallampati: II  TM distance: >3 FB  Neck ROM: full  no difficulty expected  Dental    (+) poor dentition    Pulmonary    (+) a smoker Former,decreased breath sounds  Cardiovascular   Exercise tolerance: good (4-7 METS)    ECG reviewed  Rhythm: regular  Rate: normal    (+) hypertension      Neuro/Psych- negative ROS  GI/Hepatic/Renal/Endo    (+) hiatal hernia, GERD, liver disease (history of varices.), renal disease- stones, thyroid problem     Musculoskeletal     Abdominal     Abdomen: soft.   Substance History      OB/GYN          Other   arthritis,     ROS/Med Hx Other: Has bradycardia from beta blocker for his varices.  Sinus bradycardia with sinus arrhythmia  Septal infarct (cited on or before 23-JUL-2019)  Abnormal ECG  When compared with ECG of 23-JUL-2019 14:50,  Questionable change in initial forces of Septal leads  Confirmed by Driss Perkins ([id number]) on 5/25/2022 9:05:49 AM                     Anesthesia Plan    ASA 3     general with block     intravenous induction     Anesthetic plan, risks, benefits, and alternatives have been provided, discussed and informed consent has been obtained with: patient.  Pre-procedure education provided  Use of blood products discussed with patient  Consented to blood products.    Plan discussed with CRNA.

## 2025-02-13 NOTE — ANESTHESIA PROCEDURE NOTES
"Peripheral Block      Patient reassessed immediately prior to procedure    Patient location during procedure: OR  Start time: 2/13/2025 9:24 AM  Stop time: 2/13/2025 9:27 AM  Reason for block: at surgeon's request and post-op pain management  Performed by  CRNA/CAA: Teresa Zavala CRNA  Preanesthetic Checklist  Completed: patient identified, IV checked, site marked, risks and benefits discussed, surgical consent, monitors and equipment checked, pre-op evaluation and timeout performed  Prep:  Pt Position: supine  Sterile barriers:cap, gloves, sterile barriers and mask  Prep: ChloraPrep  Patient monitoring: blood pressure monitoring, continuous pulse oximetry and EKG  Procedure    Sedation: yes (Sedation, GA, Spinal,Epidural )  Performed under: general  Guidance:ultrasound guided    ULTRASOUND INTERPRETATION.  Using ultrasound guidance a 20 G (20g 4\" Stimuplex) gauge needle was placed in close proximity to the nerve, at which point, under ultrasound guidance anesthetic was injected in the area of the nerve and spread of the anesthesia was seen on ultrasound in close proximity thereto.  There were no abnormalities seen on ultrasound; a digital image was taken; and the patient tolerated the procedure with no complications. Images:still images obtained, printed/placed on chart    Laterality:Bilateral  Block Type:TAP  Injection Technique:single-shot  Needle Type:short-bevel  Needle Gauge:20 G  Resistance on Injection: none    Medications Used: ropivacaine (NAROPIN) injection 0.5 % - Peripheral Nerve, Transversus Abdominus Plane   225 mg - 2/13/2025 9:27:00 AM  dexamethasone (DECADRON) injection - Injection, Transversus Abdominus Plane   8 mg - 2/13/2025 9:27:00 AM  buprenorphine (BUPRENEX) injection - Injection, Transversus Abdominus Plane   0.3 mg - 2/13/2025 9:27:00 AM      Medications  Comment:Block Injection:  Total volume divided equally between all 4 injection sites      Post Assessment  Injection Assessment: " negative aspiration for heme, incremental injection and no paresthesia on injection  Patient Tolerance:comfortable throughout block  Complications:no  Additional Notes  The pt was in the supine position under general anesthesia    Under Ultrasound guidance, a BBraun 4inch 360 degree needle was advanced with Normal Saline hydro dissection of tissue.  The Internal Oblique and Transversus Abdominus muscles where visualized.  At or before the aponeurosis of Internal Oblique, local anesthetic spread was visualized in the Transversus Abdominus Plane. Injection was made incrementally with aspiration every 5 mls.  There was no  intravascular injection,  injection pressure was normal, there was no neural injection, and the procedure was completed without difficulty. The same procedure was completed for left and right sided lateral tap blocks.    Under Ultrasound guidance, a Saldana 4inch 360 degree needle was advanced with Normal Saline hydro dissection of tissue.  The Rectus and Transversus Abdominus muscles where visualized.  The needle tip was placed between the Transversus Abdominus and rectus abdominus, local anesthetic spread was visualized in the Transversus Abdominus Plane. Injection was made incrementally with aspiration every 5 mls.  There was no  intravascular injection,  injection pressure was normal, there was no neural injection, and the procedure was completed without difficulty. The same procedure was completed for left and right sided subcostal tap blocks. Thank You.      Performed by: Teresa Zavala CRNA

## 2025-02-13 NOTE — OP NOTE
INGUINAL HERNIA REPAIR LAPAROSCOPIC WITH DAVINCI ROBOT  Procedure Note    Jose Maier  2/13/2025    Pre-op Diagnosis:   Non-recurrent unilateral inguinal hernia without obstruction or gangrene [K40.90]    Post-op Diagnosis:   right indirect inguinal hernia    Indications: see above    Procedure(s):  INGUINAL HERNIA REPAIR LAPAROSCOPIC WITH DAVINCI ROBOT RIGHT    Surgeon(s):  Howie Greene MD    Anesthesia: General    Staff:   Circulator: Bhavna Liriano RN  Scrub Person: Erin Chicas  Vendor Representative: Adry Verma  Assistant: Avelino Fraga    Findings: Indirect right inguinal hernia    Operative Procedure: The patient was taken to operating suite and placed supine on the operating table.  Bilateral sequential compression devices were applied and general endotracheal anesthesia administered.  A Laurent catheter was inserted.  The abdomen was prepped and draped in usual sterile fashion.  Preoperative antibiotics were confirmed.  Timeout procedure was performed.  A Veress needle was inserted at Matthew's point. Saline drop test confirmed entry into the peritoneal space.  Pneumoperitoneum was established.  At this time a supraumbilical optical trocar was inserted in the midline.  Laparoscopic survey showed evidence of indirect right inguinal hernia.  The optical trocar was then converted to a 12 mm robotic trocar.  In the anterior axillary line bilaterally at the level of the supraumbilical trocar 8 mm robotic trocars were placed.  At this time the robot was docked and the laparoscope was inserted.  After determining target anatomy the robot was targeted and the fenestrated bipolar grasper and scissors were placed into the abdominal space under visualization.  With all instruments docked I then exited the sterile field and entered the robotic console.  Beginning on the right a peritoneal flap was created from the anterior superior iliac spine to the medial umbilical vein.  The peritoneal flap  was then dissected free with blunt and sharp dissection inferiorly to the pubic tubercle.  The pubic tubercle was exposed across the midline.  Dissection was carried laterally along the myopectineal orifice exposing the entirety of the myopectineal orifice with no evidence of femoral hernia.  Laterally the space was created to allow adequate space for mesh placement.  At this time the cord structures were identified and dissected free circumferentially from the herniated peritoneum.  At all hernia defects the peritoneum was dissected free and removed from the hernia defects and reflected back off the cord structures to allow adequate space for mesh placement.  Inspection for cord lipomas was made and there were none.  A large 3DMax mesh was placed first on the right hernia space and anchored to the Tito's ligament medially with a Vicryl suture.  The mesh covered all hernia defects with no evidence of infolding with approximation of the peritoneal flap.  The peritoneal flap was then closed with a running 2-0 V-loc suture with no evidence of peritoneal tears or exposure of the mesh.  The robot was undocked and removed and at this time all trocars were removed under direct visualization and pneumoperitoneum was evacuated.  All sponge lap and needle counts were correct.  The supraumbilical fascial defect was closed with a figure-of-eight Vicryl suture and all skin incisions closed with Monocryl subcuticular suture and dressed with SureClose.  The patient's Laurent catheter was removed and the bilateral testes were reduced into the scrotum.  Patient tolerated the procedure well.    Estimated Blood Loss: 5 mL    Specimens: None                   Drains: None    Grafts or Implants: Large right 3D max inguinal mesh preperitoneal space    Complications: None      Howie Greene MD     Date: 2/13/2025  Time: 10:16 EST

## 2025-02-25 ENCOUNTER — OFFICE VISIT (OUTPATIENT)
Dept: UROLOGY | Facility: CLINIC | Age: 70
End: 2025-02-25
Payer: COMMERCIAL

## 2025-02-25 VITALS
HEART RATE: 67 BPM | HEIGHT: 72 IN | WEIGHT: 162 LBS | DIASTOLIC BLOOD PRESSURE: 83 MMHG | BODY MASS INDEX: 21.94 KG/M2 | SYSTOLIC BLOOD PRESSURE: 134 MMHG

## 2025-02-25 DIAGNOSIS — R35.1 BENIGN PROSTATIC HYPERPLASIA WITH NOCTURIA: Primary | ICD-10-CM

## 2025-02-25 DIAGNOSIS — N40.1 BENIGN PROSTATIC HYPERPLASIA WITH NOCTURIA: Primary | ICD-10-CM

## 2025-02-25 PROCEDURE — 84153 ASSAY OF PSA TOTAL: CPT

## 2025-02-25 NOTE — PROGRESS NOTES
"Chief Complaint:    Chief Complaint   Patient presents with    Bladder Stone     6 month       Vital Signs:   /83 (BP Location: Left arm, Patient Position: Sitting)   Pulse 67   Ht 182.9 cm (72.01\")   Wt 73.5 kg (162 lb)   BMI 21.97 kg/m²   Body mass index is 21.97 kg/m².      HPI:  Jose Maier is a 69 y.o. male who presents today for follow up    History of Present Illness  Mr. Maier presents to the clinic for follow-up for detrusor instability, BPH, and bladder calculus.  He was last seen in office 6 months ago and had an x-ray completed at that time that was unremarkable.  He states he is still having some intermittent lower urinary tract symptoms but is overall okay with his lower urinary tract symptoms.  He is current IPSS score is a 5.  He does endorse getting up 3 times throughout the night.  He did follow-up with his primary care provider who recommended to restart Flomax however he has yet to been medications.  He denies any gross hematuria, fever, chills, significant hesitancy, or inability urinate. Patient's last PSA on file of 2023 was great at 0.89.  Will recheck a PSA in office today.      Past Medical History:  Past Medical History:   Diagnosis Date    Arthritis     lower spine     Chaudhry esophagus     Colon polyp     Dr. Gerald Diaz    Fissure, anal 1996    Surgeon Dr. Alfaro    GERD (gastroesophageal reflux disease)     Hiatal hernia     Hypertension     Inguinal hernia     Liver disease     cirrhois    PONV (postoperative nausea and vomiting)     Renal calcification     Thyroid disease        Current Meds:  Current Outpatient Medications   Medication Sig Dispense Refill    acetaminophen (TYLENOL) 325 MG tablet Take 2 tablets by mouth Every 4 (Four) Hours As Needed for Mild Pain. 30 tablet 0    amLODIPine-benazepril (LOTREL 5-20) 5-20 MG per capsule Take 1 capsule by mouth Every Night.      doxycycline (VIBRAMYCIN) 100 MG capsule Take 1 capsule by mouth 2 (Two) Times a Day.      " esomeprazole (nexIUM) 40 MG capsule Take 1 capsule by mouth Every Morning Before Breakfast.      ibuprofen (ADVIL,MOTRIN) 600 MG tablet Take 1 tablet by mouth Every 6 (Six) Hours As Needed for Mild Pain. 30 tablet 0    levothyroxine (SYNTHROID, LEVOTHROID) 125 MCG tablet Take 1 tablet by mouth Daily.      nadolol (CORGARD) 20 MG tablet Take 1 tablet by mouth Every Night.      traMADol (ULTRAM) 50 MG tablet Take 1 tablet by mouth Every 8 (Eight) Hours As Needed for Moderate Pain. 12 tablet 0    Vitamins A & D 5000-400 units capsule Take 1 capsule by mouth Daily.       No current facility-administered medications for this visit.        Allergies:   Allergies   Allergen Reactions    Bactrim [Sulfamethoxazole-Trimethoprim] Rash        Past Surgical History:  Past Surgical History:   Procedure Laterality Date    COLONOSCOPY      CYSTOLITHALOPAXY PERCUTANEOUS N/A 05/25/2022    Procedure: CYSTOLITHOLAPAXY WITH LASER;  Surgeon: Deuce Campbell MD;  Location: [facility];  Service: Urology;  Laterality: N/A;  with removal of residual tissue    CYSTOSCOPY LITHOLAPAXY BLADDER STONE EXTRACTION N/A 01/25/2023    Procedure: CYSTOSCOPY LITHOLAPAXY BLADDER STONE EXTRACTION;  Surgeon: Deuce Campbell MD;  Location: [facility];  Service: Urology;  Laterality: N/A;    CYSTOSCOPY TRANSURETHRAL RESECTION OF PROSTATE N/A 07/24/2019    Procedure: Cystoscopy and transurethral resection of the prostate, removal of bladder stone;  Surgeon: Deuce Campbell MD;  Location: [facility] OR;  Service: Urology    ENDOSCOPY      ESOPHAGEAL VARICES LIGATION      EYE SURGERY      Cataracts Dr. Valentin    INGUINAL HERNIA REPAIR Right 2/13/2025    Procedure: INGUINAL HERNIA REPAIR LAPAROSCOPIC WITH DAVINCI ROBOT RIGHT;  Surgeon: Howie Greene MD;  Location: [facility] OR;  Service: Robotics - DaVinci;  Laterality: Right;    KIDNEY STONE SURGERY      SEPTOPLASTY      TRIGGER FINGER RELEASE Right        Social History:  Social History      Socioeconomic History    Marital status:    Tobacco Use    Smoking status: Former     Current packs/day: 0.00     Average packs/day: 0.5 packs/day for 3.0 years (1.5 ttl pk-yrs)     Types: Cigarettes     Start date: 1976     Quit date: 1979     Years since quittin.7    Smokeless tobacco: Never   Vaping Use    Vaping status: Never Used   Substance and Sexual Activity    Alcohol use: Not Currently     Alcohol/week: 1.0 standard drink of alcohol     Types: 1 Cans of beer per week     Comment: 1/2 beer every now and then former bourbon drinker several shots per day    Drug use: No    Sexual activity: Defer     Partners: Female     Birth control/protection: None       Family History:  Family History   Problem Relation Age of Onset    Colon cancer Father     Cancer Father         Colon Cancer    Alcohol abuse Father     Throat cancer Mother     Cancer Mother         Thyroid Cancer    Cancer Brother     Heart disease Paternal Grandfather        Review of Systems:  Review of Systems   Constitutional:  Negative for chills, fatigue, fever and unexpected weight change.   Eyes:  Negative for visual disturbance.   Respiratory:  Negative for cough, chest tightness, shortness of breath and wheezing.    Cardiovascular:  Negative for chest pain.   Gastrointestinal:  Negative for abdominal pain, constipation, diarrhea, nausea and vomiting.   Genitourinary:  Positive for urgency. Negative for difficulty urinating, dysuria, frequency, hematuria, penile pain, penile swelling and scrotal swelling.   Musculoskeletal:  Negative for back pain and joint swelling.   Skin:  Negative for rash.   Neurological:  Negative for dizziness and headaches.   Psychiatric/Behavioral:  Negative for confusion and suicidal ideas.        Physical Exam:  Physical Exam  Constitutional:       General: He is not in acute distress.     Appearance: Normal appearance.   HENT:      Head: Normocephalic and atraumatic.      Nose: Nose normal.       Mouth/Throat:      Mouth: Mucous membranes are moist.   Eyes:      Conjunctiva/sclera: Conjunctivae normal.   Cardiovascular:      Rate and Rhythm: Normal rate and regular rhythm.      Pulses: Normal pulses.      Heart sounds: Normal heart sounds.   Pulmonary:      Effort: Pulmonary effort is normal.      Breath sounds: Normal breath sounds.   Abdominal:      General: Bowel sounds are normal.      Palpations: Abdomen is soft.   Musculoskeletal:         General: Normal range of motion.      Cervical back: Normal range of motion.   Skin:     General: Skin is warm.   Neurological:      General: No focal deficit present.      Mental Status: He is alert and oriented to person, place, and time.   Psychiatric:         Mood and Affect: Mood normal.         Behavior: Behavior normal.         Thought Content: Thought content normal.         Judgment: Judgment normal.         IPSS Questionnaire (AUA-7):  IPSS Questionnaire (AUA-7):         IPSS Questionnaire (AUA-7):  Over the past month…    1)  How often have you had a sensation of not emptying your bladder completely after you finish urinating?  1 - Less than 1 time in 5   2)  How often have you had to urinate again less than two hours after you finished urinating? 1 - Less than 1 time in 5   3)  How often have you found you stopped and started again several times when you urinated?  0 - Not at all   4) How difficult have you found it to postpone urination?  0 - Not at all   5) How often have you had a weak urinary stream?  0 - Not at all   6) How often have you had to push or strain to begin urination?  0 - Not at all   7) How many times did you most typically get up to urinate from the time you went to bed until the time you got up in the morning?  3 - 3 times   Total score:  0-7 mildly symptomatic                                                      5    8-19 moderately symptomatic    20-35 severely symptomatic               Recent Image (CT and/or KUB):   CT Abdomen  and Pelvis: No results found for this or any previous visit.     CT Stone Protocol: Results for orders placed during the hospital encounter of 12/15/22    CT Abdomen Pelvis Stone Protocol    Narrative  EXAM:  CT Abdomen and Pelvis Without Intravenous Contrast    EXAM DATE:  12/15/2022 2:15 PM    CLINICAL HISTORY:  Flank pain, kidney stone suspected; N21.0-Calculus in bladder    TECHNIQUE:  Axial computed tomography images of the abdomen and pelvis without  intravenous contrast.  Sagittal and coronal reformatted images were  created and reviewed.  This CT exam was performed using one or more of  the following dose reduction techniques:  automated exposure control,  adjustment of the mA and/or kV according to patient size, and/or use of  iterative reconstruction technique.    COMPARISON:  05/16/2022    FINDINGS:  LUNG BASES:  Unremarkable.  No mass.  No consolidation.    ABDOMEN:  LIVER:  Cirrhotic nodular liver contour.  GALLBLADDER AND BILE DUCTS:  Unremarkable.  No calcified stones.  No  ductal dilation.  PANCREAS:  Unremarkable.  No ductal dilation.  SPLEEN:  Unremarkable.  No splenomegaly.  ADRENALS:  Unremarkable.  No mass.  KIDNEYS AND URETERS:  Unremarkable.  No obstructing stones.  No  hydronephrosis.  STOMACH AND BOWEL:  Unremarkable.  No obstruction.  No mucosal  thickening.    PELVIS:  APPENDIX:  No findings to suggest acute appendicitis.  BLADDER:  A calcification is noted in region of the right posterior  urinary bladder prostate.  No stones.  REPRODUCTIVE:  See above.    ABDOMEN and PELVIS:  INTRAPERITONEAL SPACE:  Unremarkable.  No free air.  No significant  fluid collection.  BONES/JOINTS:  No acute fracture.  No dislocation.  SOFT TISSUES:  Unremarkable.  VASCULATURE:  Atherosclerotic disease.  No abdominal aortic aneurysm.  LYMPH NODES:  Unremarkable.  No enlarged lymph nodes.    Impression  1.  A calcification is noted in region of the right posterior urinary  bladder prostate.  2.  Cirrhotic  nodular liver contour.    This report was finalized on 12/16/2022 8:23 AM by Dr. Trevon Duarte MD.     KUB: Results for orders placed during the hospital encounter of 02/22/24    XR Abdomen KUB    Narrative  EXAM:  XR Abdomen, 1 View    EXAM DATE:  2/22/2024 9:25 AM    CLINICAL HISTORY:  flank pain; N21.0-Calculus in bladder    TECHNIQUE:  Frontal supine view of the abdomen/pelvis.    COMPARISON:  No relevant prior studies available.    FINDINGS:  GASTROINTESTINAL TRACT:  Unremarkable as visualized.  No dilation.  BONES/JOINTS:  Unremarkable as visualized.  No acute fracture.  VASCULATURE:  Atherosclerotic disease.    Impression  No acute findings.      This report was finalized on 2/22/2024 9:56 AM by Dr. Trevon Duarte MD.       Labs:  Brief Urine Lab Results       None          Pre-Admission Testing on 02/11/2025   Component Date Value Ref Range Status    Glucose 02/11/2025 90  65 - 99 mg/dL Final    BUN 02/11/2025 12  8 - 23 mg/dL Final    Creatinine 02/11/2025 0.79  0.76 - 1.27 mg/dL Final    Sodium 02/11/2025 140  136 - 145 mmol/L Final    Potassium 02/11/2025 4.8  3.5 - 5.2 mmol/L Final    Slight hemolysis detected by analyzer. Result may be falsely elevated.    Chloride 02/11/2025 104  98 - 107 mmol/L Final    CO2 02/11/2025 27.8  22.0 - 29.0 mmol/L Final    Calcium 02/11/2025 9.0  8.6 - 10.5 mg/dL Final    BUN/Creatinine Ratio 02/11/2025 15.2  7.0 - 25.0 Final    Anion Gap 02/11/2025 8.2  5.0 - 15.0 mmol/L Final    eGFR 02/11/2025 96.8  >60.0 mL/min/1.73 Final    WBC 02/11/2025 5.47  3.40 - 10.80 10*3/mm3 Final    RBC 02/11/2025 4.12 (L)  4.14 - 5.80 10*6/mm3 Final    Hemoglobin 02/11/2025 12.4 (L)  13.0 - 17.7 g/dL Final    Hematocrit 02/11/2025 38.3  37.5 - 51.0 % Final    MCV 02/11/2025 93.0  79.0 - 97.0 fL Final    MCH 02/11/2025 30.1  26.6 - 33.0 pg Final    MCHC 02/11/2025 32.4  31.5 - 35.7 g/dL Final    RDW 02/11/2025 13.4  12.3 - 15.4 % Final    RDW-SD 02/11/2025 46.0  37.0 - 54.0 fl Final    MPV  02/11/2025 10.9  6.0 - 12.0 fL Final    Platelets 02/11/2025 170  140 - 450 10*3/mm3 Final        Procedure: None  Procedures     I have reviewed and agree with the above PMH, PSH, FMH, social history, medications, allergies, and labs.     Assessment/Plan:   Problem List Items Addressed This Visit       Benign prostatic hyperplasia with nocturia - Primary    Relevant Orders    PSA Diagnostic         Health Maintenance:   Health Maintenance Due   Topic Date Due    COLORECTAL CANCER SCREENING  Never done    Pneumococcal Vaccine 50+ (1 of 1 - PCV) Never done    Hepatitis B (1 of 3 - Risk 3-dose series) Never done    ANNUAL WELLNESS VISIT  Never done    COVID-19 Vaccine (4 - 2024-25 season) 09/01/2024        Smoking Counseling: Former smoker.  Never used smokeless tobacco.  Counseling given.    Urine Incontinence: Patient reports that he is not currently experiencing any symptoms of urinary incontinence.    Patient was given instructions and counseling regarding his condition or for health maintenance advice. Please see specific information pulled into the AVS if appropriate.    Patient Education:   BPH/detrusor instability -patient's symptoms are controlled at this time with no medications.  Did advise him if symptoms worsen to restart Flomax 0.4 mg once nightly an hour prior to bedtime.  Given has not had a PSA checked since October 2023 will recheck 1 in office today.  I will call him with results once available otherwise we will see him back in a year or sooner if needed.    Visit Diagnoses:    ICD-10-CM ICD-9-CM   1. Benign prostatic hyperplasia with nocturia  N40.1 600.01    R35.1 788.43       A total of 20 minutes were spent coordinating this patient’s care in clinic today; 12 minutes of which were face-to-face with the patient, reviewing medical history and counseling on the current treatment and followup plan.  All questions were answered to patient's satisfaction.    Meds Ordered During Visit:  No orders of  the defined types were placed in this encounter.      Follow Up Appointment: 1 year  No follow-ups on file.      This document has been electronically signed by Leif Alfaro PA-C   February 25, 2025 13:08 EST    Part of this note may be an electronic transcription/translation of spoken language to printed text using the Dragon Dictation System.

## 2025-02-26 ENCOUNTER — PATIENT ROUNDING (BHMG ONLY) (OUTPATIENT)
Dept: UROLOGY | Facility: CLINIC | Age: 70
End: 2025-02-26
Payer: MEDICARE

## 2025-02-26 ENCOUNTER — TELEPHONE (OUTPATIENT)
Dept: UROLOGY | Facility: CLINIC | Age: 70
End: 2025-02-26
Payer: MEDICARE

## 2025-02-26 ENCOUNTER — OFFICE VISIT (OUTPATIENT)
Dept: SURGERY | Facility: CLINIC | Age: 70
End: 2025-02-26
Payer: COMMERCIAL

## 2025-02-26 VITALS — HEIGHT: 72 IN | BODY MASS INDEX: 21.94 KG/M2 | WEIGHT: 162 LBS

## 2025-02-26 DIAGNOSIS — K40.90 NON-RECURRENT UNILATERAL INGUINAL HERNIA WITHOUT OBSTRUCTION OR GANGRENE: Primary | ICD-10-CM

## 2025-02-26 LAB — PSA SERPL-MCNC: 1.18 NG/ML (ref 0–4)

## 2025-02-26 PROCEDURE — 99024 POSTOP FOLLOW-UP VISIT: CPT | Performed by: SURGERY

## 2025-02-26 NOTE — PROGRESS NOTES
Subjective   Jose Maier is a 69 y.o. male  is here today for follow-up.         Jose Maier is a 69 y.o. male doing well after robotic inguinal hernia repair with mesh.  Patient is doing well without complication or abnormality.  Incisions healing well.  History of Present Illness         Physical Exam  Incisions healing well, no evidence of complication or recurrence at hernia sites   Physical Exam              Assessment     Diagnoses and all orders for this visit:    1. Non-recurrent unilateral inguinal hernia without obstruction or gangrene (Primary)      Jose Maier is a 69 y.o. male doing well after robotic inguinal hernia repair with mesh.  Follow-up as needed.  Assessment & Plan          This document has been electronically signed by Howie Greene MD   February 26, 2025 12:50 EST    Patient or patient representative verbalized consent for the use of Ambient Listening during the visit with  Howie Greene MD for chart documentation. 2/26/2025  12:51 EST

## 2025-02-26 NOTE — TELEPHONE ENCOUNTER
----- Message from Leif Alfaro sent at 2/26/2025  8:13 AM EST -----  Please let patient know that PSA was good and to keep yearly follow up. Thank you.  ----- Message -----  From: Lab, Background User  Sent: 2/26/2025   2:36 AM EST  To: Leif Alfaro PA-C

## (undated) DEVICE — EVAC BLDR ELLIK 1P/U

## (undated) DEVICE — GOWN,REINF,POLY,ECL,PP SLV,XXL: Brand: MEDLINE

## (undated) DEVICE — COVER,MAYO STAND,STERILE: Brand: MEDLINE

## (undated) DEVICE — CYSTO/BLADDER IRRIGATION SET, REGULATING CLAMP

## (undated) DEVICE — BLADELESS OBTURATOR: Brand: WECK VISTA

## (undated) DEVICE — UNDERGLV SURG BIOGEL INDICAT PF 8 GRN

## (undated) DEVICE — FENESTRATED BIPOLAR FORCEPS: Brand: ENDOWRIST

## (undated) DEVICE — FIBR LASR FLEXIVA 1000 HIPOWR 1P/U

## (undated) DEVICE — COR CYSTO: Brand: MEDLINE INDUSTRIES, INC.

## (undated) DEVICE — GLV SURG PREMIERPRO MIC LTX PF SZ8 BRN

## (undated) DEVICE — FLEXIVA  PULSE  AND  FLEXIVA  PULSE  TRACTIP  LASER  FIBERS  ARE  HIGH  POWER  SINGLE-USE FIBER: Brand: FLEXIVA PULSE

## (undated) DEVICE — CATH FOL BARDEX IC 3WY 22F 30CC

## (undated) DEVICE — Device: Brand: OLYMPUS

## (undated) DEVICE — Y-TYPE TUR/BLADDER IRRIGATION SET, REGULATING CLAMP

## (undated) DEVICE — CATH FOL CONT IRR 3WY 24F 5CC

## (undated) DEVICE — SUT MNCRYL PLS ANTIB UD 4/0 PS2 18IN

## (undated) DEVICE — GLV SURG PREMIERPRO MIC LTX PF SZ7.5 BRN

## (undated) DEVICE — HF-RESECTION ELECTRODE PLASMABUTTON BUTTON, 24 FR., 12°-30°, ESG TURIS: Brand: OLYMPUS

## (undated) DEVICE — ARM DRAPE

## (undated) DEVICE — MONOPOLAR CURVED SCISSORS: Brand: ENDOWRIST

## (undated) DEVICE — EVAC BLDR UROVAC W ADAPT

## (undated) DEVICE — HDRST POSTIN FM CRDL TRACH SLOT NONCOMRESS 9X8X4IN

## (undated) DEVICE — ENCORE® LATEX MICRO SIZE 8, STERILE LATEX POWDER-FREE SURGICAL GLOVE: Brand: ENCORE

## (undated) DEVICE — CATHETER,FOLEY,100%SILICONE,20FR,10ML,LF: Brand: MEDLINE

## (undated) DEVICE — 100% SILICONE FOLEY CATHETER,5-10 ML, 2-WAY: Brand: DOVER

## (undated) DEVICE — TIP COVER ACCESSORY

## (undated) DEVICE — DRAINBAG,ANTI-REFLUX TOWER,L/F,2000ML,LL: Brand: MEDLINE

## (undated) DEVICE — PAD GRND REM POLYHESIVE A/ DISP

## (undated) DEVICE — SKIN PREP TRAY 4 COMPARTM TRAY: Brand: MEDLINE INDUSTRIES, INC.

## (undated) DEVICE — MARKER,SKIN,WI/RULER AND LABELS: Brand: MEDLINE

## (undated) DEVICE — CATH FOL BARDEX HEMATURIA 3WY 22F 30CC

## (undated) DEVICE — INSUFFLATION NEEDLE TO ESTABLISH PNEUMOPERITONEUM.: Brand: INSUFFLATION NEEDLE

## (undated) DEVICE — CVR DISP HUG U VAC STEEP TREND

## (undated) DEVICE — PK LAP GEN 70

## (undated) DEVICE — TUBING, SUCTION, 3/16" X 10', STRAIGHT: Brand: MEDLINE

## (undated) DEVICE — VIOLET BRAIDED (POLYGLACTIN 910), SYNTHETIC ABSORBABLE SUTURE: Brand: COATED VICRYL

## (undated) DEVICE — LARGE NEEDLE DRIVER: Brand: ENDOWRIST

## (undated) DEVICE — 2, DISPOSABLE SUCTION/IRRIGATOR WITH DISPOSABLE TIP: Brand: STRYKEFLOW

## (undated) DEVICE — TOWEL,OR,DSP,ST,BLUE,STD,4/PK,20PK/CS: Brand: MEDLINE

## (undated) DEVICE — SUT VIC 0/0 UR6 27IN DYED J603H

## (undated) DEVICE — URINE DRAINAGE BAG,LUER-LOCK SAMPLING, ANTI-REFLUX DEVICE, DRAIN PORT: Brand: DOVER

## (undated) DEVICE — 1LYRTR 16FR10ML100%SIL UMS SNP: Brand: MEDLINE INDUSTRIES, INC.

## (undated) DEVICE — IRRIGATOR TOOMEY 70CC

## (undated) DEVICE — 40595 XL TRENDELENBURG POSITIONING KIT: Brand: 40595 XL TRENDELENBURG POSITIONING KIT

## (undated) DEVICE — CANNULA SEAL

## (undated) DEVICE — HF-RESECTION ELECTRODE PLASMALOOP LOOP, MEDIUM, 24 FR., 12°-30°, ESG TURIS: Brand: OLYMPUS